# Patient Record
Sex: FEMALE | Race: WHITE | URBAN - METROPOLITAN AREA
[De-identification: names, ages, dates, MRNs, and addresses within clinical notes are randomized per-mention and may not be internally consistent; named-entity substitution may affect disease eponyms.]

---

## 2017-03-17 ENCOUNTER — TRANSFERRED RECORDS (OUTPATIENT)
Dept: HEALTH INFORMATION MANAGEMENT | Facility: CLINIC | Age: 62
End: 2017-03-17

## 2017-03-19 ENCOUNTER — TRANSFERRED RECORDS (OUTPATIENT)
Dept: HEALTH INFORMATION MANAGEMENT | Facility: CLINIC | Age: 62
End: 2017-03-19

## 2017-04-28 ENCOUNTER — MEDICAL CORRESPONDENCE (OUTPATIENT)
Dept: HEALTH INFORMATION MANAGEMENT | Facility: CLINIC | Age: 62
End: 2017-04-28

## 2017-06-28 ENCOUNTER — PRE VISIT (OUTPATIENT)
Dept: OTOLARYNGOLOGY | Facility: CLINIC | Age: 62
End: 2017-06-28

## 2017-06-28 NOTE — TELEPHONE ENCOUNTER
1.  Date/reason for appt:  7/06/17   Sleep Apnea    2.  Referring provider:  Misa Callaway   ---   Received records (including sleep study), sent to clinic.    3.  Call to patient (Yes / No - short description):  No, referred    Records reviewed.  All records are received.

## 2017-07-06 ENCOUNTER — OFFICE VISIT (OUTPATIENT)
Dept: OTOLARYNGOLOGY | Facility: CLINIC | Age: 62
End: 2017-07-06

## 2017-07-06 VITALS — BODY MASS INDEX: 30.78 KG/M2 | HEIGHT: 61 IN | WEIGHT: 163 LBS

## 2017-07-06 DIAGNOSIS — G47.33 OSA (OBSTRUCTIVE SLEEP APNEA): Primary | ICD-10-CM

## 2017-07-06 PROBLEM — Z80.41 FAMILY HISTORY OF MALIGNANT NEOPLASM OF OVARY: Status: ACTIVE | Noted: 2017-04-17

## 2017-07-06 PROBLEM — I10 ESSENTIAL HYPERTENSION: Status: ACTIVE | Noted: 2017-04-17

## 2017-07-06 PROBLEM — M85.80 OSTEOPENIA: Status: ACTIVE | Noted: 2017-07-03

## 2017-07-06 PROBLEM — E87.6 HYPOKALEMIA: Status: ACTIVE | Noted: 2017-01-04

## 2017-07-06 PROBLEM — Z80.0 FAMILY HISTORY OF COLON CANCER: Status: ACTIVE | Noted: 2017-04-17

## 2017-07-06 PROBLEM — K44.9 HIATAL HERNIA: Status: ACTIVE | Noted: 2017-04-17

## 2017-07-06 RX ORDER — OXYMETAZOLINE HYDROCHLORIDE 0.05 G/100ML
2 SPRAY NASAL 2 TIMES DAILY
Status: CANCELLED | OUTPATIENT
Start: 2017-07-06

## 2017-07-06 RX ORDER — GLYCOPYRROLATE 0.2 MG/ML
0.2 INJECTION, SOLUTION INTRAMUSCULAR; INTRAVENOUS ONCE
Status: CANCELLED | OUTPATIENT
Start: 2017-07-06 | End: 2017-07-06

## 2017-07-06 RX ORDER — ATENOLOL 25 MG/1
25 TABLET ORAL
COMMUNITY
Start: 2017-02-22

## 2017-07-06 ASSESSMENT — PAIN SCALES - GENERAL: PAINLEVEL: NO PAIN (0)

## 2017-07-06 NOTE — NURSING NOTE
Relevant Diagnosis: sleep apnea  Teaching Topic: Pre op teaching for DISE    Person(s) involved in teaching:  Patient     Teaching Concerns Addressed:  Pre op teaching included the need for an H&P, NPO status pre op, hospital routines, expected recovery, activity  restrictions, antimicrobial scrub, s/s of infection, pain control methods and the importance of follow up appointments.  The patient voiced an understanding of all instructions and will call with questions.     Motivation Level:  Asks Questions:   Yes  Eager to Learn:   Yes  Cooperative:   Yes  Receptive (willing/able to accept information):   Yes     Patient  demonstrates understanding of the following:  Reason for the appointment, diagnosis and treatment plan:   Yes  Knowledge of proper use of medications and conditions for which they are ordered (with special attention to potential side effects or drug interactions):   Yes  Which situations necessitate calling provider and whom to contact:   Yes        Proper use and care of  (medical equip, care aids, etc.):   NA  Nutritional needs and diet plan:   Yes  Pain management techniques:   Yes  Patient instructed on hand hygiene:  Yes  How and/when to access community resources:   NA     Infection Prevention:  Patient   demonstrates understanding of the following:  Surgical procedure site care taught   Signs and symptoms of infection taught Yes  Wound care taught Yes     Instructional Materials Used/Given: Pre op booklet.

## 2017-07-06 NOTE — NURSING NOTE
Chief Complaint   Patient presents with     Consult     sleep apnea     Moriah Lo Medical Assistant

## 2017-07-06 NOTE — PATIENT INSTRUCTIONS
1.  You were seen in the ENT Clinic today by Dr. Dejesus.  If you have any questions or concerns after your appointment, please call [phone].  2.  Plan is to move forward with a drug induced sleep endoscopy. This is an outpatient procedure that is done in the operating room.  You will need a  the day of surgery  3. You will need to consult with your primary care MD for a pre op physical.  Forms for this were sent home with you today.  4. Pearl our surgery scheduler will call you with a date and time for the procedure. [phone]  5.  Please return to the clinic one week after your procedure to review the results with Dr. Dejesus and to develop the plan of care.

## 2017-07-06 NOTE — PROGRESS NOTES
SLEEP SURGERY CONSULTATION    Patient: Leila Johnson  : 1955  CHIEF COMPLAINT: EPHRAIM    IDENTIFICATION: Dr. Doshi consulted Dr. Dejesus for surgical evaluation and possible treatment of obstructive sleep apnea syndrome for Leila Johnson.      HPI:  Leila Johnson is a 62 year old year old female who has Obstructive Sleep Apnea, documented on polysomnography on on 2017 at the [facility] sleep [place].  The apnea-hypopnea index was 47/4 events/hour, with a lowest oxyhemoglobin saturation of 67%.  Patient had 94 obstructive apneas, 0 central apneas, 0 mixed apneas, and 197 hypopneas.  Patient tried CPAP for approximately 1 month but was having issues with extreme anxiety and panic attacks. She felt like she was suffocating even with a nasal style mass. She worked with her medicine provider and the recommended consideration for upper airway stimulus and therapy. Patient has issues with pre-existing TMJ and the also felt that an oral appliance would not be appropriate.    PAST MEDICAL HISTORY:  Past Medical History:   Diagnosis Date     Benign positional vertigo      Gastroesophageal reflux disease      Hypertension      Obstructive sleep apnea      Sleep apnea        PAST SURGICAL HISTORY:  No past surgical history on file.    MEDICATIONS:  Current Outpatient Prescriptions   Medication Sig Dispense Refill     atenolol (TENORMIN) 25 MG tablet Take 25 mg by mouth       omeprazole (PRILOSEC) 20 MG CR capsule TAKE ONE CAPSULE BY MOUTH ONCE DAILY BEFORE A MEAL FOR 90 DAYS         ALLERGIES:  Allergies   Allergen Reactions     Penicillins Anaphylaxis     Verified in Meditech: Y, Severity in Meditech: S, Penicillins  Verified in Meditech: Y, Severity in Meditech: S, Penicillins     Aminobenzoate Rash     Influenza Virus Vaccine Whole - Rash  - Note: *Severe rash     Influenza Vaccines Rash     Influenza Virus Vaccine Whole - Rash  - Note: *Severe rash       SOCIAL HISTORY:  Social History     Social History  "    Marital status: Single     Spouse name: N/A     Number of children: N/A     Years of education: N/A     Occupational History     Not on file.     Social History Main Topics     Smoking status: Never Smoker     Smokeless tobacco: Not on file     Alcohol use No     Drug use: No     Sexual activity: Yes     Partners: Male     Birth control/ protection: None     Other Topics Concern     Not on file     Social History Narrative     No narrative on file       FAMILY HISTORY:  No family history on file.    REVIEW OF SYSTEMS:   ENT ROS 6/29/2017   Constitutional Unexplained fatigue   Psychology Frequently feeling depressed or sad, Frequently feeling anxious   Gastrointestinal/Genitourinary Heartburn/indigestion   Musculoskeletal Sore or stiff joints, Back pain, Neck pain   Allergy/Immunology Rash         PHYSICAL EXAM  Ht 1.549 m (5' 1\")  Wt 73.9 kg (163 lb)  BMI 30.8 kg/m2    Constitutional: healthy, alert and no distress  ENT:   MOUTH:   MMM 4; can't see posterior pharynx    EYES: extraocular movements intact        ASSESSMENT:  1.  Severe obstructive sleep apnea,  untreated    Incompletely treated sleep apnea elevates risk of death, cardiovascular disease, and motor vehicle crashes, and it creates deficits in daytime function and quality of life.  Surgical treatment can improve these clinically important outcomes; therefore surgical treatment is medically necessary if the patient is not using CPAP adequately.         PLAN:  1.  We discussed obstructive sleep apnea, including pathophysiology and consequences.  We provided the patient with written information about obstructive sleep apnea and its management.  We discussed the beneficial relationship between weight loss and sleep apnea.      2.  I discussed with the patient held upper airway stimulation therapy works. We reviewed expected outcomes as well as MRI incompatibility restrictions at this time.  We discussed what is involved in the surgery as well as as " expected recovery.  Discussed with the patient the risk of nonresponse rate as well as potential discomfort from the device itself while stimulating the tongue. We will then review the selection criteria.  Patient currently meets selection criteria at this time. We would next need to proceed with a drug-induced sleep endoscopy in order to determine airway anatomy. I did briefly discuss with the patient insurance authorization specifically through Medicare.    I spent 35 minutes face-to-face with Leila Johnson during today's office visit, of which more than 50% was spent on counseling and coordination of care, which included discussion of pathophysiology of patient's obstructive sleep apnea, treatment options, risks and benefits of each option.

## 2017-07-06 NOTE — LETTER
2017       RE: Leila Johnson  [address] [facility] [address]     Dear Colleague,    Thank you for referring your patient, Leila Johnson, to the [facility] EAR NOSE AND THROAT at [facility]. Please see a copy of my visit note below.        SLEEP SURGERY CONSULTATION    Patient: Leila Johnson  : 1955  CHIEF COMPLAINT: EPHRAIM    IDENTIFICATION: Dr. Doshi consulted Dr. Dejesus for surgical evaluation and possible treatment of obstructive sleep apnea syndrome for Leila Johnson.      HPI:  Leila Johnson is a 62 year old year old female who has Obstructive Sleep Apnea, documented on polysomnography on on 2017 at the [facility].  The apnea-hypopnea index was 47/4 events/hour, with a lowest oxyhemoglobin saturation of 67%.  Patient had 94 obstructive apneas, 0 central apneas, 0 mixed apneas, and 197 hypopneas.  Patient tried CPAP for approximately 1 month but was having issues with extreme anxiety and panic attacks. She felt like she was suffocating even with a nasal style mass. She worked with her medicine provider and the recommended consideration for upper airway stimulus and therapy. Patient has issues with pre-existing TMJ and the also felt that an oral appliance would not be appropriate.    PAST MEDICAL HISTORY:  Past Medical History:   Diagnosis Date     Benign positional vertigo      Gastroesophageal reflux disease      Hypertension      Obstructive sleep apnea      Sleep apnea        PAST SURGICAL HISTORY:  No past surgical history on file.    MEDICATIONS:  Current Outpatient Prescriptions   Medication Sig Dispense Refill     atenolol (TENORMIN) 25 MG tablet Take 25 mg by mouth       omeprazole (PRILOSEC) 20 MG CR capsule TAKE ONE CAPSULE BY MOUTH ONCE DAILY BEFORE A MEAL FOR 90 DAYS         ALLERGIES:  Allergies   Allergen Reactions     Penicillins Anaphylaxis     Verified in Meditech: Y, Severity in Meditech: S, Penicillins  Verified in  "Meditech: Y, Severity in Meditech: S, Penicillins     Aminobenzoate Rash     Influenza Virus Vaccine Whole - Rash  - Note: *Severe rash     Influenza Vaccines Rash     Influenza Virus Vaccine Whole - Rash  - Note: *Severe rash       SOCIAL HISTORY:  Social History     Social History     Marital status: Single     Spouse name: N/A     Number of children: N/A     Years of education: N/A     Occupational History     Not on file.     Social History Main Topics     Smoking status: Never Smoker     Smokeless tobacco: Not on file     Alcohol use No     Drug use: No     Sexual activity: Yes     Partners: Male     Birth control/ protection: None     Other Topics Concern     Not on file     Social History Narrative     No narrative on file       FAMILY HISTORY:  No family history on file.    REVIEW OF SYSTEMS:  [facility] ROS 6/29/2017   Constitutional Unexplained fatigue   Psychology Frequently feeling depressed or sad, Frequently feeling anxious   Gastrointestinal/Genitourinary Heartburn/indigestion   Musculoskeletal Sore or stiff joints, Back pain, Neck pain   Allergy/Immunology Rash         PHYSICAL EXAM  Ht 1.549 m (5' 1\")  Wt 73.9 kg (163 lb)  BMI 30.8 kg/m2    Constitutional: healthy, alert and no distress  ENT:   MOUTH:   MMM 4; can't see posterior pharynx    EYES: extraocular movements intact        ASSESSMENT:  1.  Severe obstructive sleep apnea,  untreated    Incompletely treated sleep apnea elevates risk of death, cardiovascular disease, and motor vehicle crashes, and it creates deficits in daytime function and quality of life.  Surgical treatment can improve these clinically important outcomes; therefore surgical treatment is medically necessary if the patient is not using CPAP adequately.         PLAN:  1.  We discussed obstructive sleep apnea, including pathophysiology and consequences.  We provided the patient with written information about obstructive sleep apnea and its management.  We discussed the beneficial " relationship between weight loss and sleep apnea.      2.  I discussed with the patient held upper airway stimulation therapy works. We reviewed expected outcomes as well as MRI incompatibility restrictions at this time.  We discussed what is involved in the surgery as well as as expected recovery.  Discussed with the patient the risk of nonresponse rate as well as potential discomfort from the device itself while stimulating the tongue. We will then review the selection criteria.  Patient currently meets selection criteria at this time. We would next need to proceed with a drug-induced sleep endoscopy in order to determine airway anatomy. I did briefly discuss with the patient insurance authorization specifically through Medicare.    I spent 35 minutes face-to-face with Leila Johnson during today's office visit, of which more than 50% was spent on counseling and coordination of care, which included discussion of pathophysiology of patient's obstructive sleep apnea, treatment options, risks and benefits of each option.      Again, thank you for allowing me to participate in the care of your patient.      Sincerely,    Susan Dejesus MD

## 2017-07-06 NOTE — MR AVS SNAPSHOT
After Visit Summary   7/6/2017    Leila Johnson    MRN: [id number]           Patient Information     Date Of Birth          1955        Visit Information        Provider Department      7/6/2017 10:30 AM Susan Dejesus MD [facility] Ear Nose and Throat        Today's Diagnoses     EPHRAIM (obstructive sleep apnea)    -  1      Care Instructions    1.  You were seen in the ENT Clinic today by Dr. Dejesus.  If you have any questions or concerns after your appointment, please call [phone].  2.  Plan is to move forward with a drug induced sleep endoscopy. This is an outpatient procedure that is done in the operating room.  You will need a  the day of surgery  3. You will need to consult with your primary care MD for a pre op physical.  Forms for this were sent home with you today.  4. Pearl gates surgery scheduler will call you with a date and time for the procedure. [phone]  5.  Please return to the clinic one week after your procedure to review the results with Dr. Dejesus and to develop the plan of care.              Follow-ups after your visit        Who to contact     Please call your clinic at [phone] to:    Ask questions about your health    Make or cancel appointments    Discuss your medicines    Learn about your test results    Speak to your doctor   If you have compliments or concerns about an experience at your clinic, or if you wish to file a complaint, please contact [facility] Patient Relations at [phone] or email us at [email or web address]         Additional Information About Your Visit        MyChart Information     [employer] gives you secure access to your electronic health record. If you see a primary care provider, you can also send messages to your care team and make appointments. If you have questions, please call your primary care clinic.  If you do not have a primary care provider, please call [phone] and they  "will assist you.      [employer] is an electronic gateway that provides easy, online access to your medical records. With [employer], you can request a clinic appointment, read your test results, renew a prescription or communicate with your care team.     To access your existing account, please contact your [facility] or call [phone] for assistance.        Care EveryWhere ID     This is your Care EveryWhere ID. This could be used by other organizations to access your [facility] medical records  [id number]        Your Vitals Were     Height BMI (Body Mass Index)                1.549 m (5' 1\") 30.8 kg/m2           Blood Pressure from Last 3 Encounters:   No data found for BP    Weight from Last 3 Encounters:   07/06/17 73.9 kg (163 lb)              We Performed the Following     Magaly-Operative Worksheet (ENT General)        Primary Care Provider    None Specified       No primary provider on file.        Equal Access to Services     [facility] : Hadii kevin kendall Sobisi, waaxda kiran, isidraybta kaalchris sifuentes, bowen cortez . So [facility] [phone].    ATENCIÓN: Si habla español, tiene a young disposición servicios gratuitos de asistencia lingüística. Llame al [phone].    We comply with applicable federal civil rights laws and [place] laws. We do not discriminate on the basis of race, color, national origin, age, disability sex, sexual orientation or gender identity.            Thank you!     Thank you for choosing [facility] EAR NOSE AND THROAT  for your care. Our goal is always to provide you with excellent care. Hearing back from our patients is one way we can continue to improve our services. Please take a few minutes to complete the written survey that you may receive in the mail after your visit with us. Thank you!             Your Updated Medication List - Protect others around you: Learn how to safely use, store and throw away your medicines " at www.disposemymeds.org.          This list is accurate as of: 7/6/17 10:54 AM.  Always use your most recent med list.                   Brand Name Dispense Instructions for use Diagnosis    atenolol 25 MG tablet    TENORMIN     Take 25 mg by mouth        omeprazole 20 MG CR capsule    priLOSEC     TAKE ONE CAPSULE BY MOUTH ONCE DAILY BEFORE A MEAL FOR 90 DAYS

## 2017-07-24 ENCOUNTER — ALLIED HEALTH/NURSE VISIT (OUTPATIENT)
Dept: SURGERY | Facility: CLINIC | Age: 62
End: 2017-07-24

## 2017-07-24 RX ORDER — ERGOCALCIFEROL 1.25 MG/1
50000 CAPSULE, LIQUID FILLED ORAL WEEKLY
COMMUNITY

## 2017-07-25 ENCOUNTER — ANESTHESIA EVENT (OUTPATIENT)
Dept: SURGERY | Facility: AMBULATORY SURGERY CENTER | Age: 62
End: 2017-07-25

## 2017-07-26 ENCOUNTER — SURGERY (OUTPATIENT)
Age: 62
End: 2017-07-26

## 2017-07-26 ENCOUNTER — ANESTHESIA (OUTPATIENT)
Dept: SURGERY | Facility: AMBULATORY SURGERY CENTER | Age: 62
End: 2017-07-26

## 2017-07-26 ENCOUNTER — HOSPITAL ENCOUNTER (OUTPATIENT)
Facility: AMBULATORY SURGERY CENTER | Age: 62
End: 2017-07-26
Attending: OTOLARYNGOLOGY

## 2017-07-26 VITALS
HEIGHT: 62 IN | DIASTOLIC BLOOD PRESSURE: 84 MMHG | SYSTOLIC BLOOD PRESSURE: 139 MMHG | TEMPERATURE: 96.9 F | HEART RATE: 66 BPM | OXYGEN SATURATION: 94 % | BODY MASS INDEX: 29.81 KG/M2 | WEIGHT: 162 LBS | RESPIRATION RATE: 16 BRPM

## 2017-07-26 DIAGNOSIS — G47.33 OSA (OBSTRUCTIVE SLEEP APNEA): Primary | ICD-10-CM

## 2017-07-26 RX ORDER — FENTANYL CITRATE 50 UG/ML
25-50 INJECTION, SOLUTION INTRAMUSCULAR; INTRAVENOUS
Status: DISCONTINUED | OUTPATIENT
Start: 2017-07-26 | End: 2017-07-26 | Stop reason: HOSPADM

## 2017-07-26 RX ORDER — GLYCOPYRROLATE 0.2 MG/ML
0.2 INJECTION, SOLUTION INTRAMUSCULAR; INTRAVENOUS ONCE
Status: COMPLETED | OUTPATIENT
Start: 2017-07-26 | End: 2017-07-26

## 2017-07-26 RX ORDER — DEXAMETHASONE SODIUM PHOSPHATE 10 MG/ML
10 INJECTION, SOLUTION INTRAMUSCULAR; INTRAVENOUS ONCE
Status: CANCELLED | OUTPATIENT
Start: 2017-07-26 | End: 2017-07-26

## 2017-07-26 RX ORDER — PROPOFOL 10 MG/ML
INJECTION, EMULSION INTRAVENOUS CONTINUOUS PRN
Status: DISCONTINUED | OUTPATIENT
Start: 2017-07-26 | End: 2017-07-26

## 2017-07-26 RX ORDER — ONDANSETRON 4 MG/1
4 TABLET, ORALLY DISINTEGRATING ORAL EVERY 30 MIN PRN
Status: DISCONTINUED | OUTPATIENT
Start: 2017-07-26 | End: 2017-07-27 | Stop reason: HOSPADM

## 2017-07-26 RX ORDER — CLINDAMYCIN PHOSPHATE 900 MG/50ML
900 INJECTION, SOLUTION INTRAVENOUS
Status: CANCELLED | OUTPATIENT
Start: 2017-07-26

## 2017-07-26 RX ORDER — OXYMETAZOLINE HYDROCHLORIDE 0.05 G/100ML
SPRAY NASAL PRN
Status: DISCONTINUED | OUTPATIENT
Start: 2017-07-26 | End: 2017-07-26 | Stop reason: HOSPADM

## 2017-07-26 RX ORDER — LIDOCAINE HYDROCHLORIDE 20 MG/ML
INJECTION, SOLUTION INFILTRATION; PERINEURAL PRN
Status: DISCONTINUED | OUTPATIENT
Start: 2017-07-26 | End: 2017-07-26

## 2017-07-26 RX ORDER — CLINDAMYCIN PHOSPHATE 900 MG/50ML
900 INJECTION, SOLUTION INTRAVENOUS SEE ADMIN INSTRUCTIONS
Status: CANCELLED | OUTPATIENT
Start: 2017-07-26

## 2017-07-26 RX ORDER — FENTANYL CITRATE 50 UG/ML
25-50 INJECTION, SOLUTION INTRAMUSCULAR; INTRAVENOUS
Status: DISCONTINUED | OUTPATIENT
Start: 2017-07-26 | End: 2017-07-27 | Stop reason: HOSPADM

## 2017-07-26 RX ORDER — LIDOCAINE 40 MG/G
CREAM TOPICAL
Status: DISCONTINUED | OUTPATIENT
Start: 2017-07-26 | End: 2017-07-26 | Stop reason: HOSPADM

## 2017-07-26 RX ORDER — OXYMETAZOLINE HYDROCHLORIDE 0.05 G/100ML
2 SPRAY NASAL 2 TIMES DAILY
Status: DISCONTINUED | OUTPATIENT
Start: 2017-07-26 | End: 2017-07-26 | Stop reason: HOSPADM

## 2017-07-26 RX ORDER — SODIUM CHLORIDE, SODIUM LACTATE, POTASSIUM CHLORIDE, CALCIUM CHLORIDE 600; 310; 30; 20 MG/100ML; MG/100ML; MG/100ML; MG/100ML
INJECTION, SOLUTION INTRAVENOUS CONTINUOUS
Status: DISCONTINUED | OUTPATIENT
Start: 2017-07-26 | End: 2017-07-27 | Stop reason: HOSPADM

## 2017-07-26 RX ORDER — ONDANSETRON 2 MG/ML
4 INJECTION INTRAMUSCULAR; INTRAVENOUS EVERY 30 MIN PRN
Status: DISCONTINUED | OUTPATIENT
Start: 2017-07-26 | End: 2017-07-27 | Stop reason: HOSPADM

## 2017-07-26 RX ORDER — NALOXONE HYDROCHLORIDE 0.4 MG/ML
.1-.4 INJECTION, SOLUTION INTRAMUSCULAR; INTRAVENOUS; SUBCUTANEOUS
Status: DISCONTINUED | OUTPATIENT
Start: 2017-07-26 | End: 2017-07-27 | Stop reason: HOSPADM

## 2017-07-26 RX ORDER — PROPOFOL 10 MG/ML
INJECTION, EMULSION INTRAVENOUS PRN
Status: DISCONTINUED | OUTPATIENT
Start: 2017-07-26 | End: 2017-07-26

## 2017-07-26 RX ORDER — MEPERIDINE HYDROCHLORIDE 25 MG/ML
12.5 INJECTION INTRAMUSCULAR; INTRAVENOUS; SUBCUTANEOUS
Status: DISCONTINUED | OUTPATIENT
Start: 2017-07-26 | End: 2017-07-27 | Stop reason: HOSPADM

## 2017-07-26 RX ORDER — SODIUM CHLORIDE, SODIUM LACTATE, POTASSIUM CHLORIDE, CALCIUM CHLORIDE 600; 310; 30; 20 MG/100ML; MG/100ML; MG/100ML; MG/100ML
INJECTION, SOLUTION INTRAVENOUS CONTINUOUS
Status: DISCONTINUED | OUTPATIENT
Start: 2017-07-26 | End: 2017-07-26 | Stop reason: HOSPADM

## 2017-07-26 RX ORDER — LIDOCAINE HYDROCHLORIDE 40 MG/ML
SOLUTION TOPICAL PRN
Status: DISCONTINUED | OUTPATIENT
Start: 2017-07-26 | End: 2017-07-26 | Stop reason: HOSPADM

## 2017-07-26 RX ADMIN — PROPOFOL 100 MCG/KG/MIN: 10 INJECTION, EMULSION INTRAVENOUS at 07:32

## 2017-07-26 RX ADMIN — PROPOFOL 20 MG: 10 INJECTION, EMULSION INTRAVENOUS at 07:32

## 2017-07-26 RX ADMIN — GLYCOPYRROLATE 0.2 MG: 0.2 INJECTION, SOLUTION INTRAMUSCULAR; INTRAVENOUS at 06:17

## 2017-07-26 RX ADMIN — LIDOCAINE HYDROCHLORIDE 60 MG: 20 INJECTION, SOLUTION INFILTRATION; PERINEURAL at 07:32

## 2017-07-26 RX ADMIN — OXYMETAZOLINE HYDROCHLORIDE 1 SPRAY: 0.05 SPRAY NASAL at 07:35

## 2017-07-26 RX ADMIN — SODIUM CHLORIDE, SODIUM LACTATE, POTASSIUM CHLORIDE, CALCIUM CHLORIDE: 600; 310; 30; 20 INJECTION, SOLUTION INTRAVENOUS at 06:16

## 2017-07-26 RX ADMIN — LIDOCAINE HYDROCHLORIDE 4 ML: 40 SOLUTION TOPICAL at 07:36

## 2017-07-26 NOTE — ANESTHESIA POSTPROCEDURE EVALUATION
Patient: Leila Johnson    Procedure(s):  Drug Induced Sleep Endoscopy - Wound Class: II-Clean Contaminated    Diagnosis:Obstructive Sleep Apnea  Diagnosis Additional Information: No value filed.    Anesthesia Type:  MAC    Note:  Anesthesia Post Evaluation    Patient location during evaluation: PACU  Patient participation: Able to fully participate in evaluation  Level of consciousness: awake  Pain management: adequate  Airway patency: patent  Cardiovascular status: acceptable  Respiratory status: acceptable  Hydration status: acceptable  PONV: none             Last vitals:  Vitals:    07/26/17 0745 07/26/17 0754 07/26/17 0810   BP: 127/71 122/67 139/84   Pulse: 71 67 66   Resp:      Temp: 35.9  C (96.6  F) 36.2  C (97.2  F) 36.1  C (96.9  F)   SpO2: 96% 94% 94%         Electronically Signed By: Vinny Owen MD  July 26, 2017  10:28 AM

## 2017-07-26 NOTE — IP AVS SNAPSHOT
MRN:[id number]                      After Visit Summary   7/26/2017    Leila Johnson    MRN: [id number]           Thank you!     Thank you for choosing [facility] for your care. Our goal is always to provide you with excellent care. Hearing back from our patients is one way we can continue to improve our services. Please take a few minutes to complete the written survey that you may receive in the mail after you visit with us. Thank you!        Patient Information     Date Of Birth          1955        About your hospital stay     You were admitted on:  July 26, 2017 You last received care in the:  [facility]    You were discharged on:  July 26, 2017       Who to Call     For medical emergencies, please call 911.  For non-urgent questions about your medical care, please call your primary care provider or clinic, [phone]  For questions related to your surgery, please call your surgery clinic        Attending Provider     Provider Susan Hernandes MD Otolaryngology       Primary Care Provider Office Phone # Fax [facility] [phone]      After Care Instructions     Diet Instructions       Resume pre procedure diet            Discharge Instructions       Patient to follow up with Dr. Dejesus on 8/3/17                  Your next 10 appointments already scheduled     Aug 03, 2017 11:30 AM CDT   (Arrive by 11:15 AM)   Return Visit with Susan Dejesus MD   [facility] ([facility])    [address] [place] [address] [phone]              Further instructions from your care team       [facility]  Home Care Following Anesthesia  For 24 hours after surgery:  1. Get plenty of rest.  A responsible adult must stay with you for at least 24 hours after you leave the surgery center.  2. Do not drive or use  heavy equipment.  If you have weakness or tingling, don't drive or use heavy equipment until this feeling goes away.   3. Do not drink alcohol.   4. Avoid strenuous or risky activities.  Ask for help when climbing stairs.  5. You may feel lightheaded.  IF so, sit for a few minutes before standing.  Have someone help you get up.   6. If you have nausea (feel sick to your stomach): Drink only clear liquids such as apple juice, ginger ale, broth or 7-Up.  Rest may also help.  Be sure to drink enough fluids.  Move to a regular diet as you feel able.   7. You may have a slight fever.  Call the doctor if your fever is over 100 F (37.7 C) (taken under the tongue) or lasts longer than 24 hours.  8. You may have a dry mouth, a sore throat, muscle aches or trouble sleeping. These should go away after 24 hours.  9. Do not make important or legal decisions.          Tips for taking pain medications  To get the best pain relief possible, remember these points:    Take pain medications as directed, before pain becomes severe.    Pain medication can upset your stomach: taking it with food may help.    Constipation is a common side effect of pain medication. Drink plenty of  fluids.    Eat foods high in fiber. Take a stool softener if recommended by your doctor or pharmacist.    Do not drink alcohol, drive or operate machinery while taking pain medications.    Ask about other ways to control pain, such as with heat, ice or relaxation.    Tylenol/Acetaminophen Consumption  To help encourage the safe use of acetaminophen, the makers of TYLENOL  have lowered the maximum daily dose for single-ingredient Extra Strength TYLENOL  (acetaminophen) products sold in the [place] from 8 pills per day (4,000 mg) to 6 pills per day (3,000 mg). The dosing interval has also changed from 2 pills every 4-6 hours to 2 pills every 6 hours.    If you feel your pain relief is insufficient, you may take Tylenol/Acetaminophen in addition to your narcotic pain  "medication.     Be careful not to exceed 3,000 mg of Tylenol/Acetaminophen in a 24 hour period from all sources.    If you are taking extra strength Tylenol/acetaminophen (500 mg), the maximum dose is 6 tablets in 24 hours.    If you are taking regular strength acetaminophen (325 mg), the maximum dose is 9 tablets in 24 hours.    Call a doctor for any of the followin. Signs of infection (fever, growing tenderness at the surgery site, a large amount of drainage or bleeding, severe pain, foul-smelling drainage, redness, swelling).  2. It has been over 8 to 10 hours since surgery and you are still not able to urinate (pass water).  3. Headache for over 24 hours.  4. Numbness, tingling or weakness the day after surgery (if you had spinal anesthesia).  Your doctor is:  Dr. Susan Forrester, ENT Otolaryngology: [phone]                    Or dial [phone] and ask for the resident on call for:  ENT Otolaryngology  For emergency care, call the:  [place] [facility]:  [phone] (TTY for hearing impaired: [phone])                Pending Results     No orders found from 2017 to 2017.            Admission Information     Date & Time Provider Department Dept. Phone    2017 Susan Dejesus MD [facility] [phone]      Your Vitals Were     Blood Pressure Pulse Temperature Respirations Height Weight    127/71 71 96.6  F (35.9  C) (Temporal) 16 1.562 m (5' 1.5\") 73.5 kg (162 lb)    Pulse Oximetry BMI (Body Mass Index)                96% 30.11 kg/m2          AdReady Information     AdReady gives you secure access to your electronic health record. If you see a primary care provider, you can also send messages to your care team and make appointments. If you have questions, please call your primary care clinic.  If you do not have a primary care provider, please call [phone] and they will assist you.      AdReady is an electronic gateway that " provides easy, online access to your medical records. With [employer], you can request a clinic appointment, read your test results, renew a prescription or communicate with your care team.     To access your existing account, please contact your [facility] or call [phone] for assistance.        Care EveryWhere ID     This is your Care EveryWhere ID. This could be used by other organizations to access your [facility] medical records  [id number]        Equal Access to Services     JAK MAC : Hadii kevin ramesh hadasho Soomaali, waaxda luqadaha, qaybta kaalmada adeegyada, bowen mcmahonin hayberry dodgepascualnitza barroso. So [facility] [phone].    ATENCIÓN: Si habla español, tiene a young disposición servicios gratuitos de asistencia lingüística. Llame al [phone].    We comply with applicable federal civil rights laws and [place] laws. We do not discriminate on the basis of race, color, national origin, age, disability sex, sexual orientation or gender identity.               Review of your medicines      CONTINUE these medicines which have NOT CHANGED        Dose / Directions    atenolol 25 MG tablet   Commonly known as:  TENORMIN        Dose:  25 mg   Take 25 mg by mouth   Refills:  0       IBUPROFEN PO        Dose:  800 mg   Take 800 mg by mouth daily as needed for moderate pain   Refills:  0       omeprazole 20 MG CR capsule   Commonly known as:  priLOSEC        TAKE ONE CAPSULE BY MOUTH ONCE DAILY BEFORE A MEAL FOR 90 DAYS   Refills:  0       vitamin D 31920 UNIT capsule   Commonly known as:  ERGOCALCIFEROL        Dose:  07868 Units   Take 50,000 Units by mouth once a week   Refills:  0                Protect others around you: Learn how to safely use, store and throw away your medicines at www.disposemymeds.org.             Medication List: This is a list of all your medications and when to take them. Check marks below indicate your daily home schedule. Keep this list as a reference.       Medications           Morning Afternoon Evening Bedtime As Needed    atenolol 25 MG tablet   Commonly known as:  TENORMIN   Take 25 mg by mouth                                IBUPROFEN PO   Take 800 mg by mouth daily as needed for moderate pain                                omeprazole 20 MG CR capsule   Commonly known as:  priLOSEC   TAKE ONE CAPSULE BY MOUTH ONCE DAILY BEFORE A MEAL FOR 90 DAYS                                vitamin D 28141 UNIT capsule   Commonly known as:  ERGOCALCIFEROL   Take 50,000 Units by mouth once a week

## 2017-07-26 NOTE — DISCHARGE INSTRUCTIONS
[facility]  Home Care Following Anesthesia  For 24 hours after surgery:  1. Get plenty of rest.  A responsible adult must stay with you for at least 24 hours after you leave the surgery center.  2. Do not drive or use heavy equipment.  If you have weakness or tingling, don't drive or use heavy equipment until this feeling goes away.   3. Do not drink alcohol.   4. Avoid strenuous or risky activities.  Ask for help when climbing stairs.  5. You may feel lightheaded.  IF so, sit for a few minutes before standing.  Have someone help you get up.   6. If you have nausea (feel sick to your stomach): Drink only clear liquids such as apple juice, ginger ale, broth or 7-Up.  Rest may also help.  Be sure to drink enough fluids.  Move to a regular diet as you feel able.   7. You may have a slight fever.  Call the doctor if your fever is over 100 F (37.7 C) (taken under the tongue) or lasts longer than 24 hours.  8. You may have a dry mouth, a sore throat, muscle aches or trouble sleeping. These should go away after 24 hours.  9. Do not make important or legal decisions.          Tips for taking pain medications  To get the best pain relief possible, remember these points:    Take pain medications as directed, before pain becomes severe.    Pain medication can upset your stomach: taking it with food may help.    Constipation is a common side effect of pain medication. Drink plenty of  fluids.    Eat foods high in fiber. Take a stool softener if recommended by your doctor or pharmacist.    Do not drink alcohol, drive or operate machinery while taking pain medications.    Ask about other ways to control pain, such as with heat, ice or relaxation.    Tylenol/Acetaminophen Consumption  To help encourage the safe use of acetaminophen, the makers of TYLENOL  have lowered the maximum daily dose for single-ingredient Extra Strength TYLENOL  (acetaminophen) products sold in the [place] from 8 pills per  day (4,000 mg) to 6 pills per day (3,000 mg). The dosing interval has also changed from 2 pills every 4-6 hours to 2 pills every 6 hours.    If you feel your pain relief is insufficient, you may take Tylenol/Acetaminophen in addition to your narcotic pain medication.     Be careful not to exceed 3,000 mg of Tylenol/Acetaminophen in a 24 hour period from all sources.    If you are taking extra strength Tylenol/acetaminophen (500 mg), the maximum dose is 6 tablets in 24 hours.    If you are taking regular strength acetaminophen (325 mg), the maximum dose is 9 tablets in 24 hours.    Call a doctor for any of the followin. Signs of infection (fever, growing tenderness at the surgery site, a large amount of drainage or bleeding, severe pain, foul-smelling drainage, redness, swelling).  2. It has been over 8 to 10 hours since surgery and you are still not able to urinate (pass water).  3. Headache for over 24 hours.  4. Numbness, tingling or weakness the day after surgery (if you had spinal anesthesia).  Your doctor is:  Dr. Susan Forrester, ENT Otolaryngology: [phone]                    Or dial [phone] and ask for the resident on call for:  ENT Otolaryngology  For emergency care, call the:  [facility]:  [phone] (TTY for hearing impaired: [phone])

## 2017-07-26 NOTE — ANESTHESIA PREPROCEDURE EVALUATION
Anesthesia Evaluation     .             ROS/MED HX    ENT/Pulmonary:     (+)sleep apnea, , . .    Neurologic:  - neg neurologic ROS     Cardiovascular:     (+) hypertension----. : . . . :. .       METS/Exercise Tolerance:  4 - Raking leaves, gardening   Hematologic:  - neg hematologic  ROS       Musculoskeletal:  - neg musculoskeletal ROS       GI/Hepatic:     (+) GERD hiatal hernia,       Renal/Genitourinary:     (+) chronic renal disease,       Endo:  - neg endo ROS       Psychiatric:  - neg psychiatric ROS       Infectious Disease:  - neg infectious disease ROS       Malignancy:         Other:    (+) No chance of pregnancy C-spine cleared: N/A, no H/O Chronic Pain,no other significant disability                    Physical Exam  Normal systems: pulmonary and dental    Airway   Mallampati: III    Dental     Cardiovascular   Rhythm and rate: regular and normal      Pulmonary                     Anesthesia Plan      History & Physical Review  History and physical reviewed and following examination; no interval change.    ASA Status:  2 .    NPO Status:  > 8 hours    Plan for MAC with Intravenous and Propofol induction. Reason for MAC:  Difficulty with conscious sedation (QS)  PONV prophylaxis:  Ondansetron (or other 5HT-3)       Postoperative Care  Postoperative pain management:  IV analgesics.      Consents  Anesthetic plan, risks, benefits and alternatives discussed with:  Patient..                          .

## 2017-07-26 NOTE — OP NOTE
PREOPERATIVE DIAGNOSIS:   obstructive sleep apnea.        POSTOPERATIVE DIAGNOSIS:   obstructive sleep apnea.        PROCEDURE:  Drug-induced sleep endoscopy.        SURGEON:  Susan Dejesus MD        ANESTHESIA:  Monitored anesthesia care.        SPECIMENS REMOVED:  None.        COMPLICATIONS:  None.        INDICATIONS:  Patient is a 62 year old female with sever obstructive sleep apnea, who has difficulty tolerating CPAP therapy.        FINDINGS:  V: Lateral %           O: Lateral %           T : AP 75-90%            E: Passive   Improvement of velum and base of tongue obstruction with chin lift, no improvement with head turn          DESCRIPTION OF PROCEDURE:  The patient was brought into the operating room, placed on the operating table in supine position.  A member of the Department of Anesthesia was present and supplied the monitored anesthesia care.  A timeout was taken to correctly identify the patient and the procedure.  Once the patient reached an appropriate level of sedation, an endoscope was introduced into the patient's left nostril.  The upper airway was observed.  Findings are as above.  The scope was removed.  The patient was handed back over to Anesthesia and transferred to the PACU in stable condition.

## 2017-07-26 NOTE — IP AVS SNAPSHOT
[facility]    [address] [place] [address]    Phone:  [phone]    Fax:  [phone]                                       After Visit Summary   7/26/2017    Leila Johnson    MRN: [id number]           After Visit Summary Signature Page     I have received my discharge instructions, and my questions have been answered. I have discussed any challenges I see with this plan with the nurse or doctor.    ..........................................................................................................................................  Patient/Patient Representative Signature      ..........................................................................................................................................  Patient Representative Print Name and Relationship to Patient    ..................................................               ................................................  Date                                            Time    ..........................................................................................................................................  Reviewed by Signature/Title    ...................................................              ..............................................  Date                                                            Time

## 2017-08-03 ENCOUNTER — OFFICE VISIT (OUTPATIENT)
Dept: OTOLARYNGOLOGY | Facility: CLINIC | Age: 62
End: 2017-08-03

## 2017-08-03 VITALS — BODY MASS INDEX: 30.58 KG/M2 | HEIGHT: 61 IN | WEIGHT: 162 LBS

## 2017-08-03 DIAGNOSIS — G47.33 OSA (OBSTRUCTIVE SLEEP APNEA): Primary | ICD-10-CM

## 2017-08-03 ASSESSMENT — PAIN SCALES - GENERAL: PAINLEVEL: NO PAIN (0)

## 2017-08-03 NOTE — MR AVS SNAPSHOT
After Visit Summary   8/3/2017    Leila Johnson    MRN: [id number]           Patient Information     Date Of Birth          1955        Visit Information        Provider Department      8/3/2017 11:30 AM Susan Dejesus MD [facility]        Care Instructions    2.  The plan is to move forward with a palate procedure (UPPP) for your sleep apnea.  3. This procedure requires a prior authorization from your insurance company. We will start this process today.    4. Surgery will be scheduled after is has been approved.  Surgery scheduler is Pearl, she is reachable at [phone].  5. You will need to obtain a pre op physical by your primary care physician. This is only good for 30 days so please wait until you have a surgery date.    6. If you did not receive pre op teaching or an informational packet today, this will be mailed to you. Teaching will be done over the phone.              Follow-ups after your visit        Who to contact     Please call your clinic at [phone] to:    Ask questions about your health    Make or cancel appointments    Discuss your medicines    Learn about your test results    Speak to your doctor   If you have compliments or concerns about an experience at your clinic, or if you wish to file a complaint, please contact [facility] Patient Relations at [phone] or email us at [email or web address]         Additional Information About Your Visit        MyChart Information     Pocket[employer] Limited[employer] gives you secure access to your electronic health record. If you see a primary care provider, you can also send messages to your care team and make appointments. If you have questions, please call your primary care clinic.  If you do not have a primary care provider, please call [phone] and they will assist you.      Kamelio is an electronic gateway that provides easy, online access to your medical records. With  "MyChart, you can request a clinic appointment, read your test results, renew a prescription or communicate with your care team.     To access your existing account, please contact your [facility] or call [phone] for assistance.        Care EveryWhere ID     This is your Care EveryWhere ID. This could be used by other organizations to access your [place] medical records  [id number]        Your Vitals Were     Height BMI (Body Mass Index)                1.562 m (5' 1.5\") 30.12 kg/m2           Blood Pressure from Last 3 Encounters:   07/26/17 139/84    Weight from Last 3 Encounters:   08/03/17 73.5 kg (162 lb)   07/26/17 73.5 kg (162 lb)   07/06/17 73.9 kg (163 lb)              Today, you had the following     No orders found for display       Primary Care Provider Office Phone # Fax #    [facility] [phone]       [address] [facility] [address]        Equal Access to Services     JAK MAC : Hadii aad ku hadasho Soomaali, waaxda luqadaha, qaybta kaalmada adeegyada, waxay idiin hayjoaon fede cortez . So [facility] [phone].    ATENCIÓN: Si habla español, tiene a young disposición servicios gratuitos de asistencia lingüística. Llame al [phone].    We comply with applicable federal civil rights laws and [place] laws. We do not discriminate on the basis of race, color, national origin, age, disability sex, sexual orientation or gender identity.            Thank you!     Thank you for choosing [facility]  for your care. Our goal is always to provide you with excellent care. Hearing back from our patients is one way we can continue to improve our services. Please take a few minutes to complete the written survey that you may receive in the mail after your visit with us. Thank you!             Your Updated Medication List - Protect others around you: Learn how to safely use, store and throw away your medicines at " www.disposemymeds.org.          This list is accurate as of: 8/3/17 11:42 AM.  Always use your most recent med list.                   Brand Name Dispense Instructions for use Diagnosis    atenolol 25 MG tablet    TENORMIN     Take 25 mg by mouth        IBUPROFEN PO      Take 800 mg by mouth daily as needed for moderate pain        omeprazole 20 MG CR capsule    priLOSEC     TAKE ONE CAPSULE BY MOUTH ONCE DAILY BEFORE A MEAL FOR 90 DAYS        vitamin D 01186 UNIT capsule    ERGOCALCIFEROL     Take 50,000 Units by mouth once a week

## 2017-08-03 NOTE — PATIENT INSTRUCTIONS
2.  The plan is to move forward with a palate procedure (UPPP) for your sleep apnea.  3. This procedure requires a prior authorization from your insurance company. We will start this process today.    4. Surgery will be scheduled after is has been approved.  Surgery scheduler is Pearl, she is reachable at [phone].  5. You will need to obtain a pre op physical by your primary care physician. This is only good for 30 days so please wait until you have a surgery date.    6. If you did not receive pre op teaching or an informational packet today, this will be mailed to you. Teaching will be done over the phone.

## 2017-08-03 NOTE — LETTER
8/3/2017      RE: Leila Johnson  [address] [facility] [address]       CHIEF COMPLAINT:  Status post drug-induced sleep endoscopy.     HISTORY OF PRESENT ILLNESS:  The patient returns to clinic for followup of drug-induced sleep endoscopy.  She has severe obstructive sleep apnea with an AHI of 47.4.  Lowest oxygen saturation of 67%.  She is having panic attacks with CPAP and has preexisting TMJ  which would likely be exacerbated by an oral appliance.  She was interested in upper airway stimulation therapy.  The sleep endoscopy showed lateral wall collapse which is contraindicated for upper airway stimulation therapy.      ASSESSMENT AND PLAN:  I discussed with the patient the findings of the sleep endoscopy and that she is not currently a candidate for upper airway stimulation therapy.  We discussed alternatives including lateral expansion pharyngoplasty/UPPP technique.  We discussed risks of the procedure as well as the expected outcomes.  I discussed that this may not be a cure for her sleep apnea.  We discussed recovery of two to three weeks.  The patient would like to proceed.  I have already placed surgical orders.      Patient understands that isolated palate surgery alone may not always decrease the severity obstructive sleep apnea.  We advocate a multilevel approach in order to decrease the severity of obstructive sleep apnea.  We aim to improve the nighttime oropharyngeal airway, improve daytime symptoms of obstructive sleep apnea, and potentially facilitate the effectiveness of CPAP therapy by decreasing pressure requirements.  We discussed the expected course of one to two night stay in the hospital, two to three weeks of throat pain, and two to three weeks of pureed/soft diet.  Patient may have temporary dysphagia, voice change, velopharyngeal insufficiency, and tongue numbness.    Patient was taught what is included and excluded in a soft diet.  Patient advised to take one to two weeks off from  work.  Post-surgical medications will include an antibiotic for one week and a narcotic for pain control.      We discussed risks, including but not limited to post-operative bleeding (immediate and delayed), nasopharyngeal stenosis, residual obstruction, velopharyngeal insufficiency, dry throat sensation, infection, and others.      I spent a total of 15 minutes face-to-face with Leila Johnson during today's office visit.  Over 50% of this time was spent counseling the patient on and/or coordinating care as documented in my assessment and plan.    Susan Dejesus MD

## 2017-08-03 NOTE — PROGRESS NOTES
CHIEF COMPLAINT:  Status post drug-induced sleep endoscopy.      HISTORY OF PRESENT ILLNESS:  The patient returns to clinic for followup of drug-induced sleep endoscopy.  She has severe obstructive sleep apnea with an AHI of 47.4.  Lowest oxygen saturation of 67%.  She is having panic attacks with CPAP and has preexisting TMJ  which would likely be exacerbated by an oral appliance.  She was interested in upper airway stimulation therapy.  The sleep endoscopy showed lateral wall collapse which is contraindicated for upper airway stimulation therapy.      ASSESSMENT AND PLAN:  I discussed with the patient the findings of the sleep endoscopy and that she is not currently a candidate for upper airway stimulation therapy.  We discussed alternatives including lateral expansion pharyngoplasty/UPPP technique.  We discussed risks of the procedure as well as the expected outcomes.  I discussed that this may not be a cure for her sleep apnea.  We discussed recovery of two to three weeks.  The patient would like to proceed.  I have already placed surgical orders.      Patient understands that isolated palate surgery alone may not always decrease the severity obstructive sleep apnea.  We advocate a multilevel approach in order to decrease the severity of obstructive sleep apnea.  We aim to improve the nighttime oropharyngeal airway, improve daytime symptoms of obstructive sleep apnea, and potentially facilitate the effectiveness of CPAP therapy by decreasing pressure requirements.  We discussed the expected course of one to two night stay in the hospital, two to three weeks of throat pain, and two to three weeks of pureed/soft diet.  Patient may have temporary dysphagia, voice change, velopharyngeal insufficiency, and tongue numbness.    Patient was taught what is included and excluded in a soft diet.  Patient advised to take one to two weeks off from work.  Post-surgical medications will include an antibiotic for one week and a  narcotic for pain control.      We discussed risks, including but not limited to post-operative bleeding (immediate and delayed), nasopharyngeal stenosis, residual obstruction, velopharyngeal insufficiency, dry throat sensation, infection, and others.      I spent a total of 15 minutes face-to-face with Leila Johnson during today's office visit.  Over 50% of this time was spent counseling the patient on and/or coordinating care as documented in my assessment and plan.      JH/ms

## 2017-08-18 ENCOUNTER — TELEPHONE (OUTPATIENT)
Dept: OTOLARYNGOLOGY | Facility: CLINIC | Age: 62
End: 2017-08-18

## 2017-08-18 NOTE — TELEPHONE ENCOUNTER
8/10/17  Left msg for call back from Dr Dejesus's office    8/29/17  Lt msg    9/19/17 lt msg with call back #    10/17/17  Scheduled for 11/8/17 with Dr Oleg Carroll   ENT [facility]-Op Coordinator  [phone]

## 2017-10-26 ENCOUNTER — TELEPHONE (OUTPATIENT)
Dept: OTOLARYNGOLOGY | Facility: CLINIC | Age: 62
End: 2017-10-26

## 2017-10-26 NOTE — TELEPHONE ENCOUNTER
10/26/17  Spoke with patient, confirmed surgery will be at [facility] On 11/8/17.  PAC phone call will be on 11/6/17 at 6:00 pm.    Pearl Carroll   ENT [facility]-Op Coordinator  [phone]

## 2017-11-06 ENCOUNTER — APPOINTMENT (OUTPATIENT)
Dept: SURGERY | Facility: CLINIC | Age: 62
End: 2017-11-06

## 2017-11-07 ENCOUNTER — ANESTHESIA EVENT (OUTPATIENT)
Dept: SURGERY | Facility: CLINIC | Age: 62
End: 2017-11-07
Payer: MEDICARE

## 2017-11-07 NOTE — ANESTHESIA PREPROCEDURE EVALUATION
Anesthesia Evaluation     . Pt has had prior anesthetic. Type: General    No history of anesthetic complications          ROS/MED HX    ENT/Pulmonary:     (+)sleep apnea, , . .    Neurologic:  - neg neurologic ROS     Cardiovascular:     (+) hypertension-range: 130s/70s, ---. : . . . :. . Previous cardiac testing date:results:Stress Testdate: results:Stress echo Jan 2017 - negative date: results: date: results:          METS/Exercise Tolerance:     Hematologic:  - neg hematologic  ROS       Musculoskeletal: Comment: Being treated for possible polymyalgia rheumatica        GI/Hepatic: Comment: colitis    (+) GERD       Renal/Genitourinary:  - ROS Renal section negative       Endo:  - neg endo ROS       Psychiatric:  - neg psychiatric ROS       Infectious Disease: Comment: shingles        Malignancy:      - no malignancy   Other:                   Procedure: Procedure(s):  Tonsillectomy And Uvulophaltopharyngoplasty - Wound Class:    - Wound Class:     HPI: Leila Johnson is a 62 year old female with PMHx of HTN, GERD who is undergoing above procedure for EPHRAIM with inability to tolerate CPAP. She is currently taking prednisone 10 mg q d for possible PMR.    PMHx/PSHx:  Past Medical History:   Diagnosis Date     Benign positional vertigo      Gastroesophageal reflux disease      Hypertension 2001     Obstructive sleep apnea      Sleep apnea     No CPAP       Past Surgical History:   Procedure Laterality Date     ENDOSCOPY DRUG INDUCED SLEEP N/A 7/26/2017    Procedure: ENDOSCOPY DRUG INDUCED SLEEP;  Drug Induced Sleep Endoscopy;  Surgeon: Susan Dejesus MD;  Location:  OR         No current facility-administered medications on file prior to encounter.   Current Outpatient Prescriptions on File Prior to Encounter:  vitamin D (ERGOCALCIFEROL) 18200 UNIT capsule Take 50,000 Units by mouth once a week   IBUPROFEN PO Take 800 mg by mouth daily as needed for moderate pain   atenolol (TENORMIN) 25 MG tablet Take  25 mg by mouth   omeprazole (PRILOSEC) 20 MG CR capsule TAKE ONE CAPSULE BY MOUTH ONCE DAILY BEFORE A MEAL FOR 90 DAYS       Social Hx:   Social History   Substance Use Topics     Smoking status: Never Smoker     Smokeless tobacco: Never Used     Alcohol use No       Allergies:   Allergies   Allergen Reactions     Penicillins Anaphylaxis     Verified in [employer]: Y, Severity in [employer]: S, Penicillins  Verified in Meditech: Y, Severity in Meditech: S, Penicillins     Aminobenzoate Rash     Influenza Virus Vaccine Whole - Rash  - Note: *Severe rash     Influenza Vaccines Rash     Influenza Virus Vaccine Whole - Rash  - Note: *Severe rash         NPO Status: Per ASA Guidelines    Labs:    Blood Bank:  No results found for: ABO, RH, AS  BMP:  No results for input(s): NA, POTASSIUM, CHLORIDE, CO2, BUN, CR, GLC, JB in the last 93959 hours.  CBC:   No results for input(s): WBC, RBC, HGB, HCT, MCV, MCH, MCHC, RDW, PLT in the last 59354 hours.  Coags:  No results for input(s): INR, PTT, FIBR in the last 41978 hours.          Physical Exam  Normal systems: cardiovascular and pulmonary    Airway   Mallampati: III  TM distance: >3 FB  Neck ROM: full    Dental     Cardiovascular   Rhythm and rate: regular and normal      Pulmonary    breath sounds clear to auscultation                    Anesthesia Plan      History & Physical Review  History and physical reviewed and following examination; no interval change.    ASA Status:  3 .    NPO Status:  > 6 hours    Plan for General, RSI and ETT with Intravenous and Propofol induction. Maintenance will be Balanced.    PONV prophylaxis:  Ondansetron (or other 5HT-3) and Dexamethasone or Solumedrol  Additional equipment: Videolaryngoscope      Postoperative Care  Postoperative pain management:  Multi-modal analgesia and IV analgesics.      Consents  Anesthetic plan, risks, benefits and alternatives discussed with:  Patient.  Use of blood products discussed: No .   .            To be  discussed with staff.   - ASA 3  - GETA/RSI with standard ASA monitors, IV induction, balanced anesthetic  - PIV x1  - Antibiotics per surgery  - PONV prophylaxis    Darrin Juarez                  .

## 2017-11-08 ENCOUNTER — ANESTHESIA (OUTPATIENT)
Dept: SURGERY | Facility: CLINIC | Age: 62
End: 2017-11-08
Payer: MEDICARE

## 2017-11-08 ENCOUNTER — SURGERY (OUTPATIENT)
Age: 62
End: 2017-11-08

## 2017-11-08 ENCOUNTER — HOSPITAL ENCOUNTER (OUTPATIENT)
Facility: CLINIC | Age: 62
Discharge: HOME | End: 2017-11-09
Attending: OTOLARYNGOLOGY | Admitting: OTOLARYNGOLOGY
Payer: MEDICARE

## 2017-11-08 DIAGNOSIS — Z98.890 POSTOPERATIVE STATE: ICD-10-CM

## 2017-11-08 DIAGNOSIS — G89.18 ACUTE POST-OPERATIVE PAIN: Primary | ICD-10-CM

## 2017-11-08 PROBLEM — G47.33 OSA (OBSTRUCTIVE SLEEP APNEA): Status: ACTIVE | Noted: 2017-11-08

## 2017-11-08 LAB
GLUCOSE BLDC GLUCOMTR-MCNC: 99 MG/DL (ref 70–99)
HGB BLD-MCNC: 11.9 G/DL (ref 11.7–15.7)

## 2017-11-08 PROCEDURE — 37000009 ZZH ANESTHESIA TECHNICAL FEE, EACH ADDTL 15 MIN: Performed by: OTOLARYNGOLOGY

## 2017-11-08 PROCEDURE — 25000564 ZZH DESFLURANE, EA 15 MIN: Performed by: OTOLARYNGOLOGY

## 2017-11-08 PROCEDURE — C9399 UNCLASSIFIED DRUGS OR BIOLOG: HCPCS | Performed by: STUDENT IN AN ORGANIZED HEALTH CARE EDUCATION/TRAINING PROGRAM

## 2017-11-08 PROCEDURE — 25000128 H RX IP 250 OP 636: Performed by: STUDENT IN AN ORGANIZED HEALTH CARE EDUCATION/TRAINING PROGRAM

## 2017-11-08 PROCEDURE — 36415 COLL VENOUS BLD VENIPUNCTURE: CPT | Performed by: ANESTHESIOLOGY

## 2017-11-08 PROCEDURE — 71000015 ZZH RECOVERY PHASE 1 LEVEL 2 EA ADDTL HR: Performed by: OTOLARYNGOLOGY

## 2017-11-08 PROCEDURE — 27210995 ZZH RX 272: Performed by: OTOLARYNGOLOGY

## 2017-11-08 PROCEDURE — 82962 GLUCOSE BLOOD TEST: CPT

## 2017-11-08 PROCEDURE — 40000170 ZZH STATISTIC PRE-PROCEDURE ASSESSMENT II: Performed by: OTOLARYNGOLOGY

## 2017-11-08 PROCEDURE — 85018 HEMOGLOBIN: CPT | Performed by: ANESTHESIOLOGY

## 2017-11-08 PROCEDURE — 71000014 ZZH RECOVERY PHASE 1 LEVEL 2 FIRST HR: Performed by: OTOLARYNGOLOGY

## 2017-11-08 PROCEDURE — 27210794 ZZH OR GENERAL SUPPLY STERILE: Performed by: OTOLARYNGOLOGY

## 2017-11-08 PROCEDURE — 36000057 ZZH SURGERY LEVEL 3 1ST 30 MIN - UMMC: Performed by: OTOLARYNGOLOGY

## 2017-11-08 PROCEDURE — 25000132 ZZH RX MED GY IP 250 OP 250 PS 637: Mod: GY | Performed by: STUDENT IN AN ORGANIZED HEALTH CARE EDUCATION/TRAINING PROGRAM

## 2017-11-08 PROCEDURE — S5010 5% DEXTROSE AND 0.45% SALINE: HCPCS | Performed by: STUDENT IN AN ORGANIZED HEALTH CARE EDUCATION/TRAINING PROGRAM

## 2017-11-08 PROCEDURE — 25000125 ZZHC RX 250: Performed by: OTOLARYNGOLOGY

## 2017-11-08 PROCEDURE — 25000125 ZZHC RX 250: Performed by: STUDENT IN AN ORGANIZED HEALTH CARE EDUCATION/TRAINING PROGRAM

## 2017-11-08 PROCEDURE — S0077 INJECTION, CLINDAMYCIN PHOSP: HCPCS | Performed by: OTOLARYNGOLOGY

## 2017-11-08 PROCEDURE — A9270 NON-COVERED ITEM OR SERVICE: HCPCS | Mod: GY | Performed by: STUDENT IN AN ORGANIZED HEALTH CARE EDUCATION/TRAINING PROGRAM

## 2017-11-08 PROCEDURE — 25800025 ZZH RX 258: Performed by: STUDENT IN AN ORGANIZED HEALTH CARE EDUCATION/TRAINING PROGRAM

## 2017-11-08 PROCEDURE — 37000008 ZZH ANESTHESIA TECHNICAL FEE, 1ST 30 MIN: Performed by: OTOLARYNGOLOGY

## 2017-11-08 PROCEDURE — 88304 TISSUE EXAM BY PATHOLOGIST: CPT | Performed by: OTOLARYNGOLOGY

## 2017-11-08 PROCEDURE — 25000128 H RX IP 250 OP 636: Performed by: ANESTHESIOLOGY

## 2017-11-08 PROCEDURE — 36000059 ZZH SURGERY LEVEL 3 EA 15 ADDTL MIN UMMC: Performed by: OTOLARYNGOLOGY

## 2017-11-08 RX ORDER — LIDOCAINE HYDROCHLORIDE 20 MG/ML
INJECTION, SOLUTION INFILTRATION; PERINEURAL PRN
Status: DISCONTINUED | OUTPATIENT
Start: 2017-11-08 | End: 2017-11-08

## 2017-11-08 RX ORDER — FENTANYL CITRATE 50 UG/ML
INJECTION, SOLUTION INTRAMUSCULAR; INTRAVENOUS PRN
Status: DISCONTINUED | OUTPATIENT
Start: 2017-11-08 | End: 2017-11-08

## 2017-11-08 RX ORDER — DEXAMETHASONE SODIUM PHOSPHATE 4 MG/ML
10 INJECTION, SOLUTION INTRA-ARTICULAR; INTRALESIONAL; INTRAMUSCULAR; INTRAVENOUS; SOFT TISSUE ONCE
Status: DISCONTINUED | OUTPATIENT
Start: 2017-11-08 | End: 2017-11-08 | Stop reason: HOSPADM

## 2017-11-08 RX ORDER — DEXAMETHASONE SODIUM PHOSPHATE 4 MG/ML
8 INJECTION, SOLUTION INTRA-ARTICULAR; INTRALESIONAL; INTRAMUSCULAR; INTRAVENOUS; SOFT TISSUE ONCE
Status: DISCONTINUED | OUTPATIENT
Start: 2017-11-08 | End: 2017-11-08

## 2017-11-08 RX ORDER — DEXAMETHASONE SODIUM PHOSPHATE 10 MG/ML
10 INJECTION INTRAMUSCULAR; INTRAVENOUS ONCE
Status: COMPLETED | OUTPATIENT
Start: 2017-11-08 | End: 2017-11-08

## 2017-11-08 RX ORDER — ATENOLOL 25 MG/1
25 TABLET ORAL DAILY
Status: DISCONTINUED | OUTPATIENT
Start: 2017-11-09 | End: 2017-11-09 | Stop reason: HOSPADM

## 2017-11-08 RX ORDER — CLINDAMYCIN PHOSPHATE 900 MG/50ML
900 INJECTION, SOLUTION INTRAVENOUS SEE ADMIN INSTRUCTIONS
Status: DISCONTINUED | OUTPATIENT
Start: 2017-11-08 | End: 2017-11-08 | Stop reason: HOSPADM

## 2017-11-08 RX ORDER — EPHEDRINE SULFATE 50 MG/ML
INJECTION, SOLUTION INTRAMUSCULAR; INTRAVENOUS; SUBCUTANEOUS PRN
Status: DISCONTINUED | OUTPATIENT
Start: 2017-11-08 | End: 2017-11-08

## 2017-11-08 RX ORDER — NALOXONE HYDROCHLORIDE 0.4 MG/ML
.1-.4 INJECTION, SOLUTION INTRAMUSCULAR; INTRAVENOUS; SUBCUTANEOUS
Status: DISCONTINUED | OUTPATIENT
Start: 2017-11-08 | End: 2017-11-09 | Stop reason: HOSPADM

## 2017-11-08 RX ORDER — CLINDAMYCIN PHOSPHATE 900 MG/50ML
900 INJECTION, SOLUTION INTRAVENOUS
Status: DISCONTINUED | OUTPATIENT
Start: 2017-11-08 | End: 2017-11-08 | Stop reason: HOSPADM

## 2017-11-08 RX ORDER — SODIUM CHLORIDE, SODIUM LACTATE, POTASSIUM CHLORIDE, CALCIUM CHLORIDE 600; 310; 30; 20 MG/100ML; MG/100ML; MG/100ML; MG/100ML
INJECTION, SOLUTION INTRAVENOUS CONTINUOUS
Status: DISCONTINUED | OUTPATIENT
Start: 2017-11-08 | End: 2017-11-08 | Stop reason: HOSPADM

## 2017-11-08 RX ORDER — HYDROMORPHONE HYDROCHLORIDE 1 MG/ML
.3-.5 INJECTION, SOLUTION INTRAMUSCULAR; INTRAVENOUS; SUBCUTANEOUS EVERY 5 MIN PRN
Status: DISCONTINUED | OUTPATIENT
Start: 2017-11-08 | End: 2017-11-08 | Stop reason: HOSPADM

## 2017-11-08 RX ORDER — ONDANSETRON 2 MG/ML
4 INJECTION INTRAMUSCULAR; INTRAVENOUS EVERY 6 HOURS PRN
Status: DISCONTINUED | OUTPATIENT
Start: 2017-11-08 | End: 2017-11-09 | Stop reason: HOSPADM

## 2017-11-08 RX ORDER — DIPHENHYDRAMINE HYDROCHLORIDE AND LIDOCAINE HYDROCHLORIDE AND ALUMINUM HYDROXIDE AND MAGNESIUM HYDRO
10 KIT EVERY 6 HOURS PRN
Status: DISCONTINUED | OUTPATIENT
Start: 2017-11-08 | End: 2017-11-09 | Stop reason: HOSPADM

## 2017-11-08 RX ORDER — OXYCODONE HCL 5 MG/5 ML
5-10 SOLUTION, ORAL ORAL EVERY 4 HOURS PRN
Status: DISCONTINUED | OUTPATIENT
Start: 2017-11-08 | End: 2017-11-08

## 2017-11-08 RX ORDER — FENTANYL CITRATE 50 UG/ML
25-50 INJECTION, SOLUTION INTRAMUSCULAR; INTRAVENOUS EVERY 5 MIN PRN
Status: DISCONTINUED | OUTPATIENT
Start: 2017-11-08 | End: 2017-11-08 | Stop reason: HOSPADM

## 2017-11-08 RX ORDER — PROPOFOL 10 MG/ML
INJECTION, EMULSION INTRAVENOUS PRN
Status: DISCONTINUED | OUTPATIENT
Start: 2017-11-08 | End: 2017-11-08

## 2017-11-08 RX ORDER — BUPIVACAINE HYDROCHLORIDE AND EPINEPHRINE 5; 5 MG/ML; UG/ML
INJECTION, SOLUTION PERINEURAL PRN
Status: DISCONTINUED | OUTPATIENT
Start: 2017-11-08 | End: 2017-11-08 | Stop reason: HOSPADM

## 2017-11-08 RX ORDER — SODIUM CHLORIDE, SODIUM LACTATE, POTASSIUM CHLORIDE, CALCIUM CHLORIDE 600; 310; 30; 20 MG/100ML; MG/100ML; MG/100ML; MG/100ML
INJECTION, SOLUTION INTRAVENOUS CONTINUOUS PRN
Status: DISCONTINUED | OUTPATIENT
Start: 2017-11-08 | End: 2017-11-08

## 2017-11-08 RX ORDER — MORPHINE SULFATE 2 MG/ML
1-2 INJECTION, SOLUTION INTRAMUSCULAR; INTRAVENOUS
Status: DISCONTINUED | OUTPATIENT
Start: 2017-11-08 | End: 2017-11-09

## 2017-11-08 RX ORDER — LABETALOL HYDROCHLORIDE 5 MG/ML
10 INJECTION, SOLUTION INTRAVENOUS ONCE
Status: COMPLETED | OUTPATIENT
Start: 2017-11-08 | End: 2017-11-08

## 2017-11-08 RX ORDER — MAGNESIUM HYDROXIDE 1200 MG/15ML
LIQUID ORAL PRN
Status: DISCONTINUED | OUTPATIENT
Start: 2017-11-08 | End: 2017-11-08 | Stop reason: HOSPADM

## 2017-11-08 RX ORDER — PROCHLORPERAZINE MALEATE 5 MG
5-10 TABLET ORAL EVERY 6 HOURS PRN
Status: DISCONTINUED | OUTPATIENT
Start: 2017-11-08 | End: 2017-11-09 | Stop reason: HOSPADM

## 2017-11-08 RX ORDER — ONDANSETRON 4 MG/1
4 TABLET, ORALLY DISINTEGRATING ORAL EVERY 30 MIN PRN
Status: DISCONTINUED | OUTPATIENT
Start: 2017-11-08 | End: 2017-11-08 | Stop reason: HOSPADM

## 2017-11-08 RX ORDER — ONDANSETRON 4 MG/1
4 TABLET, ORALLY DISINTEGRATING ORAL EVERY 6 HOURS PRN
Status: DISCONTINUED | OUTPATIENT
Start: 2017-11-08 | End: 2017-11-09 | Stop reason: HOSPADM

## 2017-11-08 RX ORDER — ONDANSETRON 2 MG/ML
INJECTION INTRAMUSCULAR; INTRAVENOUS PRN
Status: DISCONTINUED | OUTPATIENT
Start: 2017-11-08 | End: 2017-11-08

## 2017-11-08 RX ORDER — ONDANSETRON 2 MG/ML
4 INJECTION INTRAMUSCULAR; INTRAVENOUS EVERY 30 MIN PRN
Status: DISCONTINUED | OUTPATIENT
Start: 2017-11-08 | End: 2017-11-08 | Stop reason: HOSPADM

## 2017-11-08 RX ADMIN — BUPIVACAINE HYDROCHLORIDE AND EPINEPHRINE BITARTRATE 3 ML: 5; .005 INJECTION, SOLUTION EPIDURAL; INTRACAUDAL; PERINEURAL at 09:50

## 2017-11-08 RX ADMIN — HYDROMORPHONE HYDROCHLORIDE 0.3 MG: 1 INJECTION, SOLUTION INTRAMUSCULAR; INTRAVENOUS; SUBCUTANEOUS at 10:12

## 2017-11-08 RX ADMIN — LIDOCAINE HYDROCHLORIDE 100 MG: 20 INJECTION, SOLUTION INFILTRATION; PERINEURAL at 09:37

## 2017-11-08 RX ADMIN — SODIUM CHLORIDE, POTASSIUM CHLORIDE, SODIUM LACTATE AND CALCIUM CHLORIDE: 600; 310; 30; 20 INJECTION, SOLUTION INTRAVENOUS at 09:28

## 2017-11-08 RX ADMIN — ROCURONIUM BROMIDE 10 MG: 10 INJECTION INTRAVENOUS at 09:57

## 2017-11-08 RX ADMIN — ROCURONIUM BROMIDE 10 MG: 10 INJECTION INTRAVENOUS at 10:35

## 2017-11-08 RX ADMIN — MORPHINE SULFATE 1 MG: 2 INJECTION, SOLUTION INTRAMUSCULAR; INTRAVENOUS at 13:12

## 2017-11-08 RX ADMIN — DEXTROSE MONOHYDRATE AND SODIUM CHLORIDE: 5; .45 INJECTION, SOLUTION INTRAVENOUS at 17:31

## 2017-11-08 RX ADMIN — PROPOFOL 140 MG: 10 INJECTION, EMULSION INTRAVENOUS at 09:37

## 2017-11-08 RX ADMIN — SUGAMMADEX 150 MG: 100 INJECTION, SOLUTION INTRAVENOUS at 11:05

## 2017-11-08 RX ADMIN — MORPHINE SULFATE 2 MG: 2 INJECTION, SOLUTION INTRAMUSCULAR; INTRAVENOUS at 19:40

## 2017-11-08 RX ADMIN — PROCHLORPERAZINE EDISYLATE 5 MG: 5 INJECTION INTRAMUSCULAR; INTRAVENOUS at 18:40

## 2017-11-08 RX ADMIN — ONDANSETRON 4 MG: 2 INJECTION INTRAMUSCULAR; INTRAVENOUS at 10:50

## 2017-11-08 RX ADMIN — FENTANYL CITRATE 100 MCG: 50 INJECTION, SOLUTION INTRAMUSCULAR; INTRAVENOUS at 09:37

## 2017-11-08 RX ADMIN — CLINDAMYCIN PHOSPHATE 900 MG: 18 INJECTION, SOLUTION INTRAVENOUS at 09:47

## 2017-11-08 RX ADMIN — FENTANYL CITRATE 25 MCG: 50 INJECTION, SOLUTION INTRAMUSCULAR; INTRAVENOUS at 11:57

## 2017-11-08 RX ADMIN — ROCURONIUM BROMIDE 20 MG: 10 INJECTION INTRAVENOUS at 09:41

## 2017-11-08 RX ADMIN — ONDANSETRON 4 MG: 2 INJECTION INTRAMUSCULAR; INTRAVENOUS at 15:45

## 2017-11-08 RX ADMIN — DEXTROSE MONOHYDRATE AND SODIUM CHLORIDE: 5; .45 INJECTION, SOLUTION INTRAVENOUS at 12:00

## 2017-11-08 RX ADMIN — Medication 60 MG: at 09:37

## 2017-11-08 RX ADMIN — SODIUM CHLORIDE 300 ML: 900 IRRIGANT IRRIGATION at 10:51

## 2017-11-08 RX ADMIN — HYDROMORPHONE HYDROCHLORIDE 0.3 MG: 1 INJECTION, SOLUTION INTRAMUSCULAR; INTRAVENOUS; SUBCUTANEOUS at 12:05

## 2017-11-08 RX ADMIN — Medication 10 MG: at 11:45

## 2017-11-08 RX ADMIN — Medication 5 MG: at 09:56

## 2017-11-08 RX ADMIN — ACETAMINOPHEN 650 MG: 325 SOLUTION ORAL at 14:27

## 2017-11-08 RX ADMIN — HYDROMORPHONE HYDROCHLORIDE 0.2 MG: 1 INJECTION, SOLUTION INTRAMUSCULAR; INTRAVENOUS; SUBCUTANEOUS at 10:53

## 2017-11-08 RX ADMIN — FENTANYL CITRATE 25 MCG: 50 INJECTION, SOLUTION INTRAMUSCULAR; INTRAVENOUS at 11:34

## 2017-11-08 RX ADMIN — DEXAMETHASONE SODIUM PHOSPHATE 10 MG: 10 INJECTION INTRAMUSCULAR; INTRAVENOUS at 18:55

## 2017-11-08 ASSESSMENT — PAIN DESCRIPTION - DESCRIPTORS: DESCRIPTORS: SORE;SHARP

## 2017-11-08 NOTE — OR NURSING
SBP greater than 160 since arrival from OR. Dr. Redmond notified. Order for one time 10 mg dose of IV labetalol received. Will continue to monitor.

## 2017-11-08 NOTE — ANESTHESIA POSTPROCEDURE EVALUATION
Patient: Leila Johnson    Procedure(s):  Tonsillectomy And Uvulophaltopharyngoplasty - Wound Class: II-Clean Contaminated   - Wound Class: II-Clean Contaminated    Diagnosis:Obstructive Sleep Apnea  Diagnosis Additional Information: No value filed.    Anesthesia Type:  General, RSI, ETT    Note:  Anesthesia Post Evaluation    Patient location during evaluation: PACU and Bedside  Patient participation: Able to fully participate in evaluation  Level of consciousness: awake  Pain management: adequate  Airway patency: patent  Cardiovascular status: acceptable  Respiratory status: acceptable  Hydration status: acceptable  PONV: none     Anesthetic complications: None          Last vitals:  Vitals:    11/08/17 1130 11/08/17 1145 11/08/17 1200   BP: 160/84 162/89 143/77   Pulse:      Resp: 14 14 16   Temp:  36.8  C (98.2  F)    SpO2: 99% 96% 96%         Electronically Signed By: Scott Redmond MD  November 8, 2017  12:07 PM

## 2017-11-08 NOTE — OP NOTE
DATE OF SURGERY: November 8, 2017    PREOPERATIVE DIAGNOSIS:  Severe obstructive sleep apnea.        POSTOPERATIVE DIAGNOSIS:  Severe obstructive sleep apnea.        OPERATIVE PROCEDURES:    1.  Bilateral tonsillectomy.    2.  Uvulopalatopharyngoplasty.        SURGEON:  Susan Dejesus MD        ASSISTANT:  Rafi Verduzco MD - ENT Resident      ANESTHESIA:  General endotracheal.        SPECIMENS REMOVED: Bilateral tonsils.        COMPLICATIONS:  None.        ESTIMATED BLOOD LOSS:  15 mL.        INDICATIONS:  Ms. Johnson is a 62 year old female with a history of obstructive sleep apnea.  The patient has a difficult time tolerating CPAP; therefore, she presents for surgical management.        FINDINGS:  2+ endophytic tonsils bilaterally. Significant redundant tissue of the posterior tonsillar pillars. Right anterior anterior pillar with superficial tear of the mucosa, which made the right tonsillars pillars asymmetric when sutured together.        DESCRIPTION:  The patient was brought into the operating room, placed on the operating table in a supine position.  A member of the Department of Anesthesia was present and intubated the patient without difficulty.  The tube was secured and the table was turned 90 degrees.  A shoulder roll was placed and the patient was draped in our normal sterile fashion.  A timeout was then taken to correctly identify the patient and the procedure.  A McIvor mouth retractor was then placed and used to expose the oropharynx.  Approximately 3 mL of 1% lidocaine with epinephrine was injected into the soft palate as well as the anterior tonsillar pillars.  The left tonsil was then grasped with an Allis clamp and medialized.  An incision was made along the anterior tonsillar border and the tonsillar capsule was identified.  The left tonsil was then dissected away from the underlying musculature using Bovie electrocautery.  The left tonsil was then passed off the field.  The right tonsil was  removed in a similar fashion.  There was minimal bleeding on the right side.        We then turned our attention to performing the palatopharyngoplasty.  A 2 cm x 0.5 cm elliptical incision was fashioned on the patient's soft palate.  This area was demucosalized and the underlying glandular tissues were removed.  The soft palate musculature was identified and left intact.  Palatopharyngeus muscle flaps were then created bilaterally and 3-0 Vicryl sutures were then used to suspend the palatopharyngeus muscle flap to the hook of the hamulus in a lateral expansion technique.  This was done bilaterally.  The incisions were then closed in layers using 3-0 Vicryl for the palatal incision and 3-0 Vicryl for the anterior and posterior tonsillar pillars.  The patient was handed back over to Anesthesia, allowed to extubate, and transferred to the PACU in stable condition.      Dr. Dejesus was present for the entire procedure.    DISPOSITION: To PACU with plans for outpatient recovery overnight for airway monitoring and pain control.     Rafi Verduzco MD  PGY-4, Otolaryngology          I, Susan Dejesus, was present during the key portions of the procedure, and I was immediately available for the entire procedure between opening and closing.

## 2017-11-08 NOTE — IP AVS SNAPSHOT
"                  MRN:[id number]                      After Visit Summary   11/8/2017    Leila Johnson    MRN: [id number]           Thank you!     Thank you for choosing [facility] for your care. Our goal is always to provide you with excellent care. Hearing back from our patients is one way we can continue to improve our services. Please take a few minutes to complete the written survey that you may receive in the mail after you visit with us. Thank you!        Patient Information     Date Of Birth          1955        About your hospital stay     You were admitted on:  November 8, 2017 You last received care in the:  Unit 6D Observation [facility]    You were discharged on:  November 9, 2017        Reason for your hospital stay       Post-operative care and monitoring                  Who to Call     For medical emergencies, please call 911.  For non-urgent questions about your medical care, please call your primary care provider or clinic, [phone]  For questions related to your surgery, please call your surgery clinic        Attending Provider     Provider Susan Hernandes MD Otolaryngology       Primary Care Provider Office Phone # Fax #    Alicia Degroot [phone]       When to contact your care team       Please notify your doctor if you experience wound breakdown, sustained bleeding from the wound site, or increasing redness, swelling, and/or purulent malorodorous discharge from the wound site which may indicate infection. If you feel it is acute, or experience sudden changes in breathing, chest pain, or excessive sleepiness/somnolence please return to the emergency department or call 911. If you have questions or concerns during the day please call ENT clinic and [phone]. If at night you can call [facility] at [phone] and ask for the \"ENT resident on call\".                  After Care Instructions     Activity       Your activity " upon discharge: No heavy lifting greater than 10 lbs and no strenuous exercise for 2 weeks or until follow up appointment. No driving while taking narcotic pain medications.            Diet       Follow this diet upon discharge: Mechanical soft diet                  Follow-up Appointments     Adult [facility] Follow-up and recommended labs and tests       Follow up in ENT clinic with Dr. Dejesus on Tuesday, November 28th at 2:30PM. Please call the clinic with questions/concerns: [phone].    Otolaryngology/ENT Clinic:  [facility] [place] [facility]  Clinics & Surgery Center  [address] [place] [address]    Appointments on [place] and/or [facility] (with [facility] or [facility] provider or service). Call [phone] if you haven't heard regarding these appointments within 7 days of discharge.                  Your next 10 appointments already scheduled     Nov 28, 2017  2:30 PM CST   (Arrive by 2:15 PM)   Return Visit with Susan Dejesus MD   [facility] Nose [facility] ([facility])    [address] [place] [id number] [phone]              Additional Information     If you use hormonal birth control (such as the pill, patch, ring or implants): You'll need a second form of birth control for 7 days (condoms, a diaphragm or contraceptive foam). While in the hospital, you received a medicine called Bridion. Your normal birth control will not work as well for a week after taking this medicine.          Pending Results     Date and Time Order Name Status Description    11/8/2017 1000 Surgical pathology exam In process             Statement of Approval     Ordered          11/09/17 1422  I have reviewed and agree with all the recommendations and orders detailed in this document.  EFFECTIVE NOW     Approved and electronically signed by:  Shabana Youssef MD             Admission Information     Date & Time Provider Department Dept.  "Phone    11/8/2017 Susan Dejesus MD Unit 6D Observation [facility] [place] [phone]      Your Vitals Were     Blood Pressure Pulse Temperature Respirations Height Weight    150/78 54 98.4  F (36.9  C) (Oral) 16 1.575 m (5' 2\") 74.5 kg (164 lb 3.9 oz)    Pulse Oximetry BMI (Body Mass Index)                93% 30.04 kg/m2          [facility] By Search Information     [facility] gives you secure access to your electronic health record. If you see a primary care provider, you can also send messages to your care team and make appointments. If you have questions, please call your primary care clinic.  If you do not have a primary care provider, please call [phone] and they will assist you.        Care EveryWhere ID     This is your Care EveryWhere ID. This could be used by other organizations to access your [facility] medical records  [id number]        Equal Access to Services     JKA MAC : Roe Rae, waylon beck, shashi sifuentes, bowen cortez . So [facility] [phone].    ATENCIÓN: Si habla español, tiene a young disposición servicios gratuitos de asistencia lingüística. Llame al [phone].    We comply with applicable federal civil rights laws and [place] laws. We do not discriminate on the basis of race, color, national origin, age, disability, sex, sexual orientation, or gender identity.               Review of your medicines      START taking        Dose / Directions    acetaminophen 325 MG tablet   Commonly known as:  TYLENOL   Used for:  Acute post-operative pain        Dose:  650 mg   Take 2 tablets (650 mg) by mouth every 6 hours as needed for mild pain   Quantity:  100 tablet   Refills:  0       FIRST-MOUTHWASH BLM MT Susp compounding kit   Used for:  Acute post-operative pain        Dose:  10 mL   Swish and spit 10 mLs in mouth every 6 hours as needed for mouth sores   Quantity:  237 mL   Refills:  0       ondansetron 4 MG ODT tab   Commonly " known as:  ZOFRAN-ODT   Used for:  Postoperative state        Dose:  4 mg   Take 1 tablet (4 mg) by mouth every 6 hours as needed for nausea or vomiting   Quantity:  8 tablet   Refills:  1       polyethylene glycol powder   Commonly known as:  MIRALAX   Used for:  Postoperative state        Dose:  1 capful   Take 17 g (1 capful) by mouth daily as needed for constipation   Quantity:  119 g   Refills:  0       predniSONE 20 MG tablet   Commonly known as:  DELTASONE   Used for:  Postoperative state        Dose:  20 mg   Start taking on:  11/11/2017   Take 1 tablet (20 mg) by mouth daily for 3 days Start taking on Saturday 11/11   Quantity:  3 tablet   Refills:  0       traMADol 50 MG tablet   Commonly known as:  ULTRAM   Used for:  Acute post-operative pain        Dose:   mg   Take 1-2 tablets ( mg) by mouth every 6 hours as needed for pain   Quantity:  80 tablet   Refills:  0         CONTINUE these medicines which have NOT CHANGED        Dose / Directions    atenolol 25 MG tablet   Commonly known as:  TENORMIN        Dose:  25 mg   Take 25 mg by mouth   Refills:  0       omeprazole 20 MG CR capsule   Commonly known as:  priLOSEC        TAKE ONE CAPSULE BY MOUTH ONCE DAILY BEFORE A MEAL FOR 90 DAYS   Refills:  0       vitamin D 13497 UNIT capsule   Commonly known as:  ERGOCALCIFEROL        Dose:  54543 Units   Take 50,000 Units by mouth once a week   Refills:  0         STOP taking     IBUPROFEN PO                Where to get your medicines      These medications were sent to [facility] - [place], [place] - [address]  [address] [address]     Phone:  [phone]     acetaminophen 325 MG tablet    FIRST-MOUTHWASH [facility] Susp compounding kit    ondansetron 4 MG ODT tab    polyethylene glycol powder    predniSONE 20 MG tablet         Some of these will need a paper prescription and others can be bought over the counter. Ask your nurse if you have questions.      Bring a paper prescription for each of these medications     traMADol 50 MG tablet                Protect others around you: Learn how to safely use, store and throw away your medicines at www.disposemymeds.org.             Medication List: This is a list of all your medications and when to take them. Check marks below indicate your daily home schedule. Keep this list as a reference.      Medications           Morning Afternoon Evening Bedtime As Needed    acetaminophen 325 MG tablet   Commonly known as:  TYLENOL   Take 2 tablets (650 mg) by mouth every 6 hours as needed for mild pain                                atenolol 25 MG tablet   Commonly known as:  TENORMIN   Take 25 mg by mouth   Last time this was given:  25 mg on 11/9/2017  8:12 AM                                FIRST-MOUTHWASH BLM MT Susp compounding kit   Swish and spit 10 mLs in mouth every 6 hours as needed for mouth sores                                omeprazole 20 MG CR capsule   Commonly known as:  priLOSEC   TAKE ONE CAPSULE BY MOUTH ONCE DAILY BEFORE A MEAL FOR 90 DAYS   Last time this was given:  20 mg on 11/9/2017  8:12 AM                                ondansetron 4 MG ODT tab   Commonly known as:  ZOFRAN-ODT   Take 1 tablet (4 mg) by mouth every 6 hours as needed for nausea or vomiting                                polyethylene glycol powder   Commonly known as:  MIRALAX   Take 17 g (1 capful) by mouth daily as needed for constipation                                predniSONE 20 MG tablet   Commonly known as:  DELTASONE   Take 1 tablet (20 mg) by mouth daily for 3 days Start taking on Saturday 11/11   Start taking on:  11/11/2017                                traMADol 50 MG tablet   Commonly known as:  ULTRAM   Take 1-2 tablets ( mg) by mouth every 6 hours as needed for pain   Last time this was given:  100 mg on 11/9/2017 11:57 AM                                vitamin D 40199 UNIT capsule   Commonly known as:  ERGOCALCIFEROL    Take 50,000 Units by mouth once a week

## 2017-11-08 NOTE — PROGRESS NOTES
"Brief Otolaryngology Note  November 8, 2017    Leila Johnson is 62 year old female with history of severe sleep apnea who is POD#0 s/p bilateral tonsillectomy and UPPP.     Subjective:   Pain is controlled with morphine. She did have a small emesis, but feels better now. States that oxycodone makes her stomach upset. Oxygen saturations are in the mid 90s on 2L. Denies shortness of breath.     Objective:  /77  Pulse 54  Temp 98.1  F (36.7  C) (Oral)  Resp 20  Ht 1.575 m (5' 2\")  Wt 74.5 kg (164 lb 3.9 oz)  SpO2 95%  BMI 30.04 kg/m2    Gen: Comfortable in bed. NAD   HEENT: NCAT. EOMI. No anterior nasal drainage or bleeding. No bleeding from the oral cavity.    A/P:  POD#0 from bilateral tonsillectomy and UPPP. Doing well with no acute issues.  -Continue current plan of care  -Will change oxycodone to tramadol; continue Tylenol q4 hours  -ENT Team to see in morning    Rafi Verduzco MD  PGY-4, Otolaryngology    "

## 2017-11-08 NOTE — IP AVS SNAPSHOT
Unit 6D Observation [address] [address]    Phone:  [phone]    Fax:  [phone]                                       After Visit Summary   11/8/2017    Leila Johnson    MRN: [id number]           After Visit Summary Signature Page     I have received my discharge instructions, and my questions have been answered. I have discussed any challenges I see with this plan with the nurse or doctor.    ..........................................................................................................................................  Patient/Patient Representative Signature      ..........................................................................................................................................  Patient Representative Print Name and Relationship to Patient    ..................................................               ................................................  Date                                            Time    ..........................................................................................................................................  Reviewed by Signature/Title    ...................................................              ..............................................  Date                                                            Time

## 2017-11-08 NOTE — ANESTHESIA CARE TRANSFER NOTE
Patient: Leila Johnson    Procedure(s):  Tonsillectomy And Uvulophaltopharyngoplasty - Wound Class: II-Clean Contaminated   - Wound Class: II-Clean Contaminated    Diagnosis: Obstructive Sleep Apnea  Diagnosis Additional Information: No value filed.    Anesthesia Type:   General, RSI, ETT     Note:  Airway :Face Mask  Patient transferred to:PACU  Comments: VSS. Breathing spontaneously at a regular rate with adequate tidal volumes and maintaining O2 sats on 6L facemask. Denies nausea or pain. No apparent complications from anesthesia.     Darrin Juarez MD  Anesthesiology resident   [facility]  Handoff Report: Identifed the Patient, Identified the Reponsible Provider, Reviewed the pertinent medical history, Discussed the surgical course, Reviewed Intra-OP anesthesia mangement and issues during anesthesia, Set expectations for post-procedure period and Allowed opportunity for questions and acknowledgement of understanding      Vitals: (Last set prior to Anesthesia Care Transfer)    CRNA VITALS  11/8/2017 1045 - 11/8/2017 1124      11/8/2017             NIBP: (!)  165/96    Pulse: 77                Electronically Signed By: Darrin Juarez MD  November 8, 2017  11:24 AM

## 2017-11-09 VITALS
TEMPERATURE: 98.4 F | DIASTOLIC BLOOD PRESSURE: 78 MMHG | OXYGEN SATURATION: 93 % | RESPIRATION RATE: 16 BRPM | HEART RATE: 54 BPM | BODY MASS INDEX: 30.22 KG/M2 | SYSTOLIC BLOOD PRESSURE: 150 MMHG | HEIGHT: 62 IN | WEIGHT: 164.24 LBS

## 2017-11-09 LAB — GLUCOSE BLDC GLUCOMTR-MCNC: 189 MG/DL (ref 70–99)

## 2017-11-09 PROCEDURE — 82962 GLUCOSE BLOOD TEST: CPT

## 2017-11-09 PROCEDURE — S5010 5% DEXTROSE AND 0.45% SALINE: HCPCS | Performed by: STUDENT IN AN ORGANIZED HEALTH CARE EDUCATION/TRAINING PROGRAM

## 2017-11-09 PROCEDURE — A9270 NON-COVERED ITEM OR SERVICE: HCPCS | Mod: GY | Performed by: STUDENT IN AN ORGANIZED HEALTH CARE EDUCATION/TRAINING PROGRAM

## 2017-11-09 PROCEDURE — 25000132 ZZH RX MED GY IP 250 OP 250 PS 637: Mod: GY | Performed by: STUDENT IN AN ORGANIZED HEALTH CARE EDUCATION/TRAINING PROGRAM

## 2017-11-09 PROCEDURE — 25800025 ZZH RX 258: Performed by: STUDENT IN AN ORGANIZED HEALTH CARE EDUCATION/TRAINING PROGRAM

## 2017-11-09 PROCEDURE — 25000128 H RX IP 250 OP 636: Performed by: STUDENT IN AN ORGANIZED HEALTH CARE EDUCATION/TRAINING PROGRAM

## 2017-11-09 RX ORDER — TRAMADOL HYDROCHLORIDE 50 MG/1
50-100 TABLET ORAL EVERY 6 HOURS PRN
Qty: 80 TABLET | Refills: 0 | Status: SHIPPED | OUTPATIENT
Start: 2017-11-09

## 2017-11-09 RX ORDER — ONDANSETRON 4 MG/1
4 TABLET, ORALLY DISINTEGRATING ORAL EVERY 6 HOURS PRN
Qty: 8 TABLET | Refills: 1 | Status: SHIPPED | OUTPATIENT
Start: 2017-11-09

## 2017-11-09 RX ORDER — ACETAMINOPHEN 325 MG/1
650 TABLET ORAL EVERY 6 HOURS PRN
Qty: 100 TABLET | Refills: 0 | Status: SHIPPED | OUTPATIENT
Start: 2017-11-09

## 2017-11-09 RX ORDER — POLYETHYLENE GLYCOL 3350 17 G/17G
1 POWDER, FOR SOLUTION ORAL DAILY PRN
Qty: 119 G | Refills: 0 | Status: SHIPPED | OUTPATIENT
Start: 2017-11-09

## 2017-11-09 RX ORDER — DIPHENHYDRAMINE HYDROCHLORIDE AND LIDOCAINE HYDROCHLORIDE AND ALUMINUM HYDROXIDE AND MAGNESIUM HYDRO
10 KIT EVERY 6 HOURS PRN
Qty: 237 ML | Refills: 0 | Status: SHIPPED | OUTPATIENT
Start: 2017-11-09

## 2017-11-09 RX ORDER — TRAMADOL HYDROCHLORIDE 50 MG/1
50-100 TABLET ORAL EVERY 4 HOURS PRN
Status: DISCONTINUED | OUTPATIENT
Start: 2017-11-09 | End: 2017-11-09 | Stop reason: HOSPADM

## 2017-11-09 RX ORDER — ACETAMINOPHEN 325 MG/1
650 TABLET ORAL EVERY 4 HOURS
Status: DISCONTINUED | OUTPATIENT
Start: 2017-11-09 | End: 2017-11-09 | Stop reason: HOSPADM

## 2017-11-09 RX ORDER — TRAMADOL HYDROCHLORIDE 50 MG/1
50-100 TABLET ORAL EVERY 6 HOURS PRN
Qty: 20 TABLET | Refills: 0 | Status: SHIPPED | OUTPATIENT
Start: 2017-11-09 | End: 2017-11-09

## 2017-11-09 RX ORDER — PREDNISONE 20 MG/1
20 TABLET ORAL DAILY
Qty: 3 TABLET | Refills: 0 | Status: SHIPPED | OUTPATIENT
Start: 2017-11-11 | End: 2017-11-14

## 2017-11-09 RX ADMIN — ACETAMINOPHEN 650 MG: 325 SOLUTION ORAL at 00:19

## 2017-11-09 RX ADMIN — ATENOLOL 25 MG: 25 TABLET ORAL at 08:12

## 2017-11-09 RX ADMIN — DEXTROSE MONOHYDRATE AND SODIUM CHLORIDE: 5; .45 INJECTION, SOLUTION INTRAVENOUS at 03:31

## 2017-11-09 RX ADMIN — OMEPRAZOLE 20 MG: 20 CAPSULE, DELAYED RELEASE ORAL at 08:12

## 2017-11-09 RX ADMIN — ONDANSETRON 4 MG: 2 INJECTION INTRAMUSCULAR; INTRAVENOUS at 12:54

## 2017-11-09 RX ADMIN — TRAMADOL HYDROCHLORIDE 100 MG: 50 TABLET, COATED ORAL at 11:57

## 2017-11-09 RX ADMIN — TRAMADOL HYDROCHLORIDE 100 MG: 50 TABLET ORAL at 07:21

## 2017-11-09 RX ADMIN — ACETAMINOPHEN 650 MG: 325 SOLUTION ORAL at 11:19

## 2017-11-09 RX ADMIN — ACETAMINOPHEN 650 MG: 325 SOLUTION ORAL at 06:56

## 2017-11-09 ASSESSMENT — PAIN DESCRIPTION - DESCRIPTORS: DESCRIPTORS: PATIENT UNABLE TO DESCRIBE

## 2017-11-09 NOTE — DISCHARGE SUMMARY
Discharge Summary  Leila Johnson  [id number]  1955    Date of Admission: 11/8/2017  Date of Discharge: 11/9/2017    Admission Diagnosis: EPHRAIM (obstructive sleep apnea) [G47.33]  Discharge Diagnosis: Same    Procedures:  Date: 11/09/2017  Procedure(s):  TONSILLECTOMY  UVULOPALATOPHARYNGOPLASTY    Pathology: pending    HPI: Leila Johnson is a 62-year-old female with a PMH significant for HTN, GERD, and EPHRAIM. Patient had polysomnography, diagnostic of obstructive sleep apnea. Recent sleep endoscopy showed lateral wall collapse. It was recommended that she undergo operative intervention and the patient consented to the above procedure after detailed explanation of the risks and benefits of said procedure.    Hospital Course: The patient was admitted to the hospital and underwent the above mentioned procedure. She tolerated the procedure without any intra- or na-operative complications. Please see the operative report for full details of the procedure. The patient was admitted for post-operative monitoring. Her postoperative course was uneventful except for episodes of emesis. At discharge, the patient's pain was well-controlled on PO analgesics, the patient was voiding on her own, she was ambulating, and she was tolerating a mechanical soft diet.    Discharge Exam:  Vitals:    11/08/17 1919 11/08/17 2215 11/09/17 0630 11/09/17 1237   BP: 142/76 134/78 162/87 150/78   BP Location: Right arm  Right arm    Pulse:       Resp: 18 18 18 16   Temp: 98  F (36.7  C) 98.3  F (36.8  C) 98.3  F (36.8  C) 98.4  F (36.9  C)   TempSrc: Oral Oral Oral Oral   SpO2: 93% 96% 94% 93%   Weight:       Height:           General: alert and oriented; resting comfortably in bed; not in acute distress  HEENT: EOMI without spontaneous or gaze evoked nystagmus. Incision is well-approximated, without bleeding or drainage. No blood clots visualized  Respiratory: breathing comfortably on room air; no stridor    Discharge Medications:   Alex  Leila NORIEGA   Home Medication Instructions SAMEERA:[id number]    Printed on:11/09/17 1423   Medication Information                      acetaminophen (TYLENOL) 325 MG tablet  Take 2 tablets (650 mg) by mouth every 6 hours as needed for mild pain             atenolol (TENORMIN) 25 MG tablet  Take 25 mg by mouth             magic mouthwash suspension (diphenhydramine, lidocaine, aluminum-magnesium & simethicone)  Swish and spit 10 mLs in mouth every 6 hours as needed for mouth sores             omeprazole (PRILOSEC) 20 MG CR capsule  TAKE ONE CAPSULE BY MOUTH ONCE DAILY BEFORE A MEAL FOR 90 DAYS             ondansetron (ZOFRAN-ODT) 4 MG ODT tab  Take 1 tablet (4 mg) by mouth every 6 hours as needed for nausea or vomiting             polyethylene glycol (MIRALAX) powder  Take 17 g (1 capful) by mouth daily as needed for constipation             predniSONE (DELTASONE) 20 MG tablet  Take 1 tablet (20 mg) by mouth daily for 3 days Start taking on Saturday 11/11             traMADol (ULTRAM) 50 MG tablet  Take 1-2 tablets ( mg) by mouth every 6 hours as needed for pain             vitamin D (ERGOCALCIFEROL) 11286 UNIT capsule  Take 50,000 Units by mouth once a week                   Discharge Procedure Orders  Reason for your hospital stay   Order Comments: Post-operative care and monitoring     Adult [facility] Follow-up and recommended labs and tests   Order Comments: Follow up in ENT clinic with Dr. Dejesus on Tuesday, November 28th at 2:30PM. Please call the clinic with questions/concerns: [phone].    Otolaryngology/ENT Clinic:  [facility]  [address] [place] [id number]    Appointments on [place] and/or [facility] (with [facility] or [facility] provider or service). Call [phone] if you haven't heard regarding these appointments within 7 days of discharge.     Activity   Order Comments: Your activity upon discharge: No heavy lifting greater than 10 lbs  "and no strenuous exercise for 2 weeks or until follow up appointment. No driving while taking narcotic pain medications.   Order Specific Question Answer Comments   Is discharge order? Yes      When to contact your care team   Order Comments: Please notify your doctor if you experience wound breakdown, sustained bleeding from the wound site, or increasing redness, swelling, and/or purulent malorodorous discharge from the wound site which may indicate infection. If you feel it is acute, or experience sudden changes in breathing, chest pain, or excessive sleepiness/somnolence please return to the emergency department or call 911. If you have questions or concerns during the day please call ENT clinic and [phone]. If at night you can call [facility] at [phone] and ask for the \"ENT resident on call\".     Full Code     Diet   Order Comments: Follow this diet upon discharge: Mechanical soft diet   Order Specific Question Answer Comments   Is discharge order? Yes          Dispo: To home in good condition. All of the patient's questions/concerns have been addressed at this time.     Shabana Youssef MD  Otolaryngology Resident, PGY-1  Please contact ENT by dialing * [phone] and entering job code [phone].      "

## 2017-11-09 NOTE — PROGRESS NOTES
All discharge instructions reviewed with patient. PIV removed. Patient is agreeable to plan and states no further questions. Patient left with all new medications. Patient left unit with all belongings via wheelchair.  will provide transportation home.

## 2017-11-09 NOTE — PROGRESS NOTES
"Patient S/P bilateral tonsillectomy and UPPP POD #1. Patient alert and oriented. Given IV morphine X1 for throat pain with reported relief. Patient has tylenol scheduled every 4 hours but patient stated she will request for it when she needs it. Up to the bathroom with SBA of 1. No emesis overnight. Patient tolerated oral fluids but is not drinking enough yet. Patient was on 2L of Oxygen overnight because she reported feeling SOB though her sats were in the higher 90's. Able to make needs known. Blood pressure 134/78, pulse 54, temperature 98.3  F (36.8  C), temperature source Oral, resp. rate 18, height 1.575 m (5' 2\"), weight 74.5 kg (164 lb 3.9 oz), SpO2 96 %.      "

## 2017-11-09 NOTE — PROVIDER NOTIFICATION
Provider paged Re: Patient reporting SOB. On 2L of oxygen and satting in the upper 90's. Other vitals stable. Would like provider to see her.

## 2017-11-09 NOTE — PLAN OF CARE
Problem: Patient Care Overview  Goal: Plan of Care/Patient Progress Review  Outpatient/Observation goals to be met before discharge home:  Patient is A & O x 4, cms intact.  LISA surgical site and patient is having difficulty opening mouth wide enough for visualization.  Patient is tolerating small sips and ice chips.  Patient had a small amount of emesis after attempting to have clears, MD aware. Patient is up with SBA and voiding spontaneously.  Patient given Zofran with some relief but required 5 mg of compazine IV. Patient is on 2 L NC when arriving to floor, plan is to wean overnight. Call light is within reach able to make needs known, will continue to monitor.

## 2017-11-10 LAB — COPATH REPORT: NORMAL

## 2017-11-28 ENCOUNTER — OFFICE VISIT (OUTPATIENT)
Dept: OTOLARYNGOLOGY | Facility: CLINIC | Age: 62
End: 2017-11-28

## 2017-11-28 DIAGNOSIS — G47.33 OSA (OBSTRUCTIVE SLEEP APNEA): Primary | ICD-10-CM

## 2017-11-28 RX ORDER — ATENOLOL 25 MG/1
TABLET ORAL
COMMUNITY
Start: 2017-05-08 | End: 2017-11-28

## 2017-11-28 ASSESSMENT — PAIN SCALES - GENERAL: PAINLEVEL: MILD PAIN (3)

## 2017-11-28 NOTE — PROGRESS NOTES
CC: s/p tonsillectomy and UPPP 11/8/17    HPI:  HISTORY OF PRESENT ILLNESS:  The patient returns for her first postoperative visit.  Overall, her pain is improving, but she still has a pretty sore throat.  She also feels like there is mucus in the back of her throat that is very sticky and hard to get rid of it at times.  She is still taking pain medications but only at night.  During the day, she uses Tylenol and ibuprofen.      PHYSICAL EXAMINATION:   GENERAL:  No acute distress.   ORAL CAVITY:  It is difficult to assess the tonsillar fossae due to tongue position as well as discomfort.  However, I am able to see the soft palate incision which is healing nicely.  She does have retained sutures still present.      ASSESSMENT AND PLAN:  The patient is healing as expected after surgery.  She does have a fair amount of sore throat that is a little above what I would expect.  I think this is likely due to some of the stitches still remaining in place.  However, given her gag reflex and sensitivity, I do not think I will be able to safely take out the stitches in clinic today.  The patient is okay with that.  She does understand they will come on their own.  I would want her to continue to taper off of the pain medications over the course of the next week.  I suspect that her healing and pain will continue to improve over the course of the next 7-10 days.  She does still feel tired, but she is not sure if that is just the result of having surgery and recovering or if this is her sleep apnea.  I agree that it is a little too soon to tell.  I will see her back in one month.

## 2017-11-28 NOTE — LETTER
11/28/2017       RE: Leila Johnson  [address] [facility] [address]     Dear Colleague,    Thank you for referring your patient, Leila Johnson, to the [facility] EAR NOSE AND THROAT at [facility]. Please see a copy of my visit note below.    CC: s/p tonsillectomy and UPPP 11/8/17    HPI:  HISTORY OF PRESENT ILLNESS:  The patient returns for her first postoperative visit.  Overall, her pain is improving, but she still has a pretty sore throat.  She also feels like there is mucus in the back of her throat that is very sticky and hard to get rid of it at times.  She is still taking pain medications but only at night.  During the day, she uses Tylenol and ibuprofen.      PHYSICAL EXAMINATION:   GENERAL:  No acute distress.   ORAL CAVITY:  It is difficult to assess the tonsillar fossae due to tongue position as well as discomfort.  However, I am able to see the soft palate incision which is healing nicely.  She does have retained sutures still present.      ASSESSMENT AND PLAN:  The patient is healing as expected after surgery.  She does have a fair amount of sore throat that is a little above what I would expect.  I think this is likely due to some of the stitches still remaining in place.  However, given her gag reflex and sensitivity, I do not think I will be able to safely take out the stitches in clinic today.  The patient is okay with that.  She does understand they will come on their own.  I would want her to continue to taper off of the pain medications over the course of the next week.  I suspect that her healing and pain will continue to improve over the course of the next 7-10 days.  She does still feel tired, but she is not sure if that is just the result of having surgery and recovering or if this is her sleep apnea.  I agree that it is a little too soon to tell.  I will see her back in one month.     Sincerely,    Susan Dejesus MD

## 2017-11-28 NOTE — PATIENT INSTRUCTIONS
1.  You were seen in the ENT Clinic today by Dr. Dejesus.  If you have any questions or concerns after your appointment, please call [phone].  Press option #1 for scheduling related needs.  Press option #3 for Nurse advice.  2.  Plan is to return to clinic in one month for another assessment.

## 2017-11-28 NOTE — MR AVS SNAPSHOT
After Visit Summary   11/28/2017    Leila Johnson    MRN: [id number]           Patient Information     Date Of Birth          1955        Visit Information        Provider Department      11/28/2017 2:30 PM Susan Dejesus MD [facility] Ear Nose and Throat        Today's Diagnoses     EPHRAIM (obstructive sleep apnea)    -  1      Care Instructions    1.  You were seen in the ENT Clinic today by Dr. Dejesus.  If you have any questions or concerns after your appointment, please call [phone].  Press option #1 for scheduling related needs.  Press option #3 for Nurse advice.  2.  Plan is to return to clinic in one month for another assessment.              Follow-ups after your visit        Your next 10 appointments already scheduled     Jan 04, 2018 11:45 AM CST   (Arrive by 11:30 AM)   Return Visit with MD IFTIKHAR Carrillo [facility] Nose [facility] Th[facility] ([facility])    [address] [place] [id number] [phone]              Who to contact     Please call your clinic at [phone] to:    Ask questions about your health    Make or cancel appointments    Discuss your medicines    Learn about your test results    Speak to your doctor   If you have compliments or concerns about an experience at your clinic, or if you wish to file a complaint, please contact [facility] Patient Relations at [phone] or email us at [email or web address]         Additional Information About Your Visit        Miraklhart Information     Swayt gives you secure access to your electronic health record. If you see a primary care provider, you can also send messages to your care team and make appointments. If you have questions, please call your primary care clinic.  If you do not have a primary care provider, please call [phone] and they will assist you.      Mix & Meet is an electronic gateway that provides easy,  online access to your medical records. With EyeEm, you can request a clinic appointment, read your test results, renew a prescription or communicate with your care team.     To access your existing account, please contact your [facility] or call [phone] for assistance.        Care EveryWhere ID     This is your Care EveryWhere ID. This could be used by other organizations to access your [facility] medical records  [id number]         Blood Pressure from Last 3 Encounters:   11/09/17 150/78   07/26/17 139/84    Weight from Last 3 Encounters:   11/08/17 74.5 kg (164 lb 3.9 oz)   08/03/17 73.5 kg (162 lb)   07/26/17 73.5 kg (162 lb)              Today, you had the following     No orders found for display       Primary Care Provider Office Phone # Fax #    Alicia Degroot [phone]       [facility] [address] [facility] [address]        Equal Access to Services     JAK MAC : Hadii aad ku hadasho Soomaali, waaxda luqadaha, qaybta kaalmada adeegyada, waxay idiin hayaan adeeg kharash la'berry . So [facility] [phone].    ATENCIÓN: Si habla español, tiene a young disposición servicios gratuitos de asistencia lingüística. Llame al [phone].    We comply with applicable federal civil rights laws and [place] laws. We do not discriminate on the basis of race, color, national origin, age, disability, sex, sexual orientation, or gender identity.            Thank you!     Thank you for choosing [facility] EAR NOSE AND THROAT  for your care. Our goal is always to provide you with excellent care. Hearing back from our patients is one way we can continue to improve our services. Please take a few minutes to complete the written survey that you may receive in the mail after your visit with us. Thank you!             Your Updated Medication List - Protect others around you: Learn how to safely use, store and throw away your medicines at  www.disposemymeds.org.          This list is accurate as of: 11/28/17  2:30 PM.  Always use your most recent med list.                   Brand Name Dispense Instructions for use Diagnosis    acetaminophen 325 MG tablet    TYLENOL    100 tablet    Take 2 tablets (650 mg) by mouth every 6 hours as needed for mild pain    Acute post-operative pain       atenolol 25 MG tablet    TENORMIN     Take 25 mg by mouth        FIRST-MOUTHWASH BLM MT Susp compounding kit     237 mL    Swish and spit 10 mLs in mouth every 6 hours as needed for mouth sores    Acute post-operative pain       omeprazole 20 MG CR capsule    priLOSEC     TAKE ONE CAPSULE BY MOUTH ONCE DAILY BEFORE A MEAL FOR 90 DAYS        ondansetron 4 MG ODT tab    ZOFRAN-ODT    8 tablet    Take 1 tablet (4 mg) by mouth every 6 hours as needed for nausea or vomiting    Postoperative state       polyethylene glycol powder    MIRALAX    119 g    Take 17 g (1 capful) by mouth daily as needed for constipation    Postoperative state       traMADol 50 MG tablet    ULTRAM    80 tablet    Take 1-2 tablets ( mg) by mouth every 6 hours as needed for pain    Acute post-operative pain       vitamin D 57574 UNIT capsule    ERGOCALCIFEROL     Take 50,000 Units by mouth once a week

## 2017-11-28 NOTE — NURSING NOTE
Chief Complaint   Patient presents with     RECHECK     Post op tonsillectomy      Unable to obtain vitals    Tashi Hughes

## 2018-01-21 ENCOUNTER — HEALTH MAINTENANCE LETTER (OUTPATIENT)
Age: 63
End: 2018-01-21

## 2018-02-11 NOTE — LETTER
8/3/2017       RE: Leila Johnson  [address] [facility] [address]     Dear Colleague,    Thank you for referring your patient, Leila Johnson, to the [facility] EAR NOSE AND THROAT at [facility]. Please see a copy of my visit note below.    CHIEF COMPLAINT:  Status post drug-induced sleep endoscopy.      HISTORY OF PRESENT ILLNESS:  The patient returns to clinic for followup of drug-induced sleep endoscopy.  She has severe obstructive sleep apnea with an AHI of 47.4.  Lowest oxygen saturation of 67%.  She is having panic attacks with CPAP and has preexisting TMJ  which would likely be exacerbated by an oral appliance.  She was interested in upper airway stimulation therapy.  The sleep endoscopy showed lateral wall collapse which is contraindicated for upper airway stimulation therapy.      ASSESSMENT AND PLAN:  I discussed with the patient the findings of the sleep endoscopy and that she is not currently a candidate for upper airway stimulation therapy.  We discussed alternatives including lateral expansion pharyngoplasty/UPPP technique.  We discussed risks of the procedure as well as the expected outcomes.  I discussed that this may not be a cure for her sleep apnea.  We discussed recovery of two to three weeks.  The patient would like to proceed.  I have already placed surgical orders.      Patient understands that isolated palate surgery alone may not always decrease the severity obstructive sleep apnea.  We advocate a multilevel approach in order to decrease the severity of obstructive sleep apnea.  We aim to improve the nighttime oropharyngeal airway, improve daytime symptoms of obstructive sleep apnea, and potentially facilitate the effectiveness of CPAP therapy by decreasing pressure requirements.  We discussed the expected course of one to two night stay in the hospital, two to three weeks of throat pain, and two to three weeks of pureed/soft diet.  Patient may have  temporary dysphagia, voice change, velopharyngeal insufficiency, and tongue numbness.    Patient was taught what is included and excluded in a soft diet.  Patient advised to take one to two weeks off from work.  Post-surgical medications will include an antibiotic for one week and a narcotic for pain control.      We discussed risks, including but not limited to post-operative bleeding (immediate and delayed), nasopharyngeal stenosis, residual obstruction, velopharyngeal insufficiency, dry throat sensation, infection, and others.      I spent a total of 15 minutes face-to-face with Leila Johnson during today's office visit.  Over 50% of this time was spent counseling the patient on and/or coordinating care as documented in my assessment and plan.     Sincerely,    Susan Dejesus MD       (3) walks occasionally

## 2020-03-10 ENCOUNTER — HEALTH MAINTENANCE LETTER (OUTPATIENT)
Age: 65
End: 2020-03-10

## 2020-12-27 ENCOUNTER — HEALTH MAINTENANCE LETTER (OUTPATIENT)
Age: 65
End: 2020-12-27

## 2021-10-09 ENCOUNTER — HEALTH MAINTENANCE LETTER (OUTPATIENT)
Age: 66
End: 2021-10-09

## 2022-01-29 ENCOUNTER — HEALTH MAINTENANCE LETTER (OUTPATIENT)
Age: 67
End: 2022-01-29

## 2022-03-26 ENCOUNTER — HEALTH MAINTENANCE LETTER (OUTPATIENT)
Age: 67
End: 2022-03-26

## 2022-09-17 ENCOUNTER — HEALTH MAINTENANCE LETTER (OUTPATIENT)
Age: 67
End: 2022-09-17

## 2023-05-06 ENCOUNTER — HEALTH MAINTENANCE LETTER (OUTPATIENT)
Age: 68
End: 2023-05-06

## (undated) DEVICE — ANTIFOG SOLUTION W/FOAM PAD 31142527

## (undated) DEVICE — SYR 10ML FINGER CONTROL W/O NDL 309695

## (undated) DEVICE — LINEN TOWEL PACK X5 5464

## (undated) DEVICE — NDL 25GA 2"  8881200441

## (undated) DEVICE — Device

## (undated) DEVICE — ESU PENCIL W/COATED BLADE E2450H

## (undated) DEVICE — SUCTION MANIFOLD DORNOCH ULTRA CART UL-CL500

## (undated) DEVICE — ESU ELEC NDL 1" COATED/INSULATED E1465

## (undated) DEVICE — TUBING SUCTION MEDI-VAC SOFT 3/16"X20' N520A

## (undated) DEVICE — ESU SUCTION CAUTERY 10FR FOOT CONTROL E2505-10FR

## (undated) DEVICE — JELLY LUBRICATING SURGILUBE 2OZ TUBE

## (undated) DEVICE — ESU GROUND PAD ADULT W/CORD E7507

## (undated) DEVICE — SPONGE COTTONOID 1/2X2" 20-32S

## (undated) DEVICE — SU VICRYL 3-0 SH 8X18" UND J864D

## (undated) DEVICE — ESU ELEC BLADE 2.75" COATED/INSULATED E1455

## (undated) DEVICE — GLOVE PROTEXIS POWDER FREE SMT 6.5  2D72PT65X

## (undated) RX ORDER — PROPOFOL 10 MG/ML
INJECTION, EMULSION INTRAVENOUS
Status: DISPENSED
Start: 2017-07-26

## (undated) RX ORDER — OXYMETAZOLINE HYDROCHLORIDE 0.05 G/100ML
SPRAY NASAL
Status: DISPENSED
Start: 2017-07-26

## (undated) RX ORDER — HYDROMORPHONE HYDROCHLORIDE 1 MG/ML
INJECTION, SOLUTION INTRAMUSCULAR; INTRAVENOUS; SUBCUTANEOUS
Status: DISPENSED
Start: 2017-11-08

## (undated) RX ORDER — CLINDAMYCIN PHOSPHATE 900 MG/50ML
INJECTION, SOLUTION INTRAVENOUS
Status: DISPENSED
Start: 2017-11-08

## (undated) RX ORDER — ONDANSETRON 2 MG/ML
INJECTION INTRAMUSCULAR; INTRAVENOUS
Status: DISPENSED
Start: 2017-11-08

## (undated) RX ORDER — LABETALOL HYDROCHLORIDE 5 MG/ML
INJECTION, SOLUTION INTRAVENOUS
Status: DISPENSED
Start: 2017-11-08

## (undated) RX ORDER — GLYCOPYRROLATE 0.2 MG/ML
INJECTION, SOLUTION INTRAMUSCULAR; INTRAVENOUS
Status: DISPENSED
Start: 2017-07-26

## (undated) RX ORDER — FENTANYL CITRATE 50 UG/ML
INJECTION, SOLUTION INTRAMUSCULAR; INTRAVENOUS
Status: DISPENSED
Start: 2017-11-08

## (undated) RX ORDER — DEXAMETHASONE SODIUM PHOSPHATE 4 MG/ML
INJECTION, SOLUTION INTRA-ARTICULAR; INTRALESIONAL; INTRAMUSCULAR; INTRAVENOUS; SOFT TISSUE
Status: DISPENSED
Start: 2017-11-08

## (undated) RX ORDER — PROPOFOL 10 MG/ML
INJECTION, EMULSION INTRAVENOUS
Status: DISPENSED
Start: 2017-11-08